# Patient Record
Sex: MALE | Race: WHITE | NOT HISPANIC OR LATINO | Employment: FULL TIME | URBAN - METROPOLITAN AREA
[De-identification: names, ages, dates, MRNs, and addresses within clinical notes are randomized per-mention and may not be internally consistent; named-entity substitution may affect disease eponyms.]

---

## 2018-07-24 ENCOUNTER — HOSPITAL ENCOUNTER (EMERGENCY)
Facility: HOSPITAL | Age: 38
Discharge: HOME | End: 2018-07-25
Attending: EMERGENCY MEDICINE

## 2018-07-24 VITALS
TEMPERATURE: 96.4 F | RESPIRATION RATE: 18 BRPM | OXYGEN SATURATION: 98 % | BODY MASS INDEX: 24.86 KG/M2 | WEIGHT: 164 LBS | SYSTOLIC BLOOD PRESSURE: 107 MMHG | HEART RATE: 114 BPM | HEIGHT: 68 IN | DIASTOLIC BLOOD PRESSURE: 60 MMHG

## 2018-07-24 DIAGNOSIS — S60.222A CONTUSION OF LEFT HAND, INITIAL ENCOUNTER: Primary | ICD-10-CM

## 2018-07-24 PROCEDURE — 90471 IMMUNIZATION ADMIN: CPT

## 2018-07-24 RX ORDER — ACETAMINOPHEN 325 MG/1
650 TABLET ORAL ONCE
Status: COMPLETED | OUTPATIENT
Start: 2018-07-25 | End: 2018-07-24

## 2018-07-24 RX ADMIN — TETANUS TOXOID, REDUCED DIPHTHERIA TOXOID AND ACELLULAR PERTUSSIS VACCINE, ADSORBED 0.5 ML: 5; 2.5; 8; 8; 2.5 SUSPENSION INTRAMUSCULAR at 23:54

## 2018-07-24 RX ADMIN — ACETAMINOPHEN 650 MG: 325 TABLET, FILM COATED ORAL at 23:53

## 2018-07-25 ENCOUNTER — APPOINTMENT (EMERGENCY)
Dept: RADIOLOGY | Facility: HOSPITAL | Age: 38
End: 2018-07-25

## 2018-07-25 PROCEDURE — 99283 EMERGENCY DEPT VISIT LOW MDM: CPT

## 2018-07-25 PROCEDURE — 90715 TDAP VACCINE 7 YRS/> IM: CPT | Performed by: EMERGENCY MEDICINE

## 2018-07-25 PROCEDURE — 73130 X-RAY EXAM OF HAND: CPT

## 2018-07-25 NOTE — DISCHARGE INSTRUCTIONS
Hand Sprain, Ambulatory Care   GENERAL INFORMATION:   A hand sprain  is when a ligament in your hand is stretched or torn  Ligaments are the strong tissues that connect bones  A hand sprain is usually caused by a fall onto your outstretched arm  You may have bruising, pain, and swelling of your injured hand  Seek immediate care for the following symptoms:   · Cold or numbness below the injury, such as in your fingers    · Increased pain, even after taking pain medicine    · New or increased trouble moving and using your hand, fingers, or wrist  Treatment for a hand sprain  may include a support device, such as a brace or splint  These devices limit movement and protect your joint  Treatment may also include pain medicine  Care for a hand sprain:   · Rest  your hand for 1 to 2 days after your injury  This will help decrease the risk of more damage to your hand  Avoid activities that cause pain  Return to normal activities as directed  · Apply ice  on your hand for 15 to 20 minutes every hour or as directed  Use an ice pack, or put crushed ice in a plastic bag  Cover it with a towel  Ice helps prevent tissue damage and decreases swelling and pain  · Use an elastic bandage as directed  An elastic bandage supports your hand and decreases swelling so it can heal  The elastic bandage should be snug but not tight  · Elevate  your hand above the level of your heart as often as you can  This will help decrease swelling and pain  Prop your hand on pillows or blankets to keep it elevated comfortably  · Exercise  your hand as directed to decrease stiffness and improve strength  You may be directed to exercise once you are able to move your hand without pain  Follow up with your healthcare provider as directed:  Write down your questions so you remember to ask them during your visits  CARE AGREEMENT:   You have the right to help plan your care  Learn about your health condition and how it may be treated  Discuss treatment options with your caregivers to decide what care you want to receive  You always have the right to refuse treatment  The above information is an  only  It is not intended as medical advice for individual conditions or treatments  Talk to your doctor, nurse or pharmacist before following any medical regimen to see if it is safe and effective for you  © 2014 [address] is for End User's use only and may not be sold, redistributed or otherwise used for commercial purposes  All illustrations and images included in CareNotes® are the copyrighted property of A D A M , Inc  or Juvenal Beckman

## 2018-07-25 NOTE — ED PROVIDER NOTES
History  Chief Complaint   Patient presents with    Hand Injury     Pt presents with left hand pain that started five hours ago when a chian snapped under tension and hit his hand  Pt states that it started to feel hot  This 41 y/o right-handed male c/o pain overlying the fourth and fifth metacarpals on his left hand  This began about 5 hours ago after injuring the hand  He was winching a car out of the mud when the chain broke  The end of the chain whipped around and struck him over the base of the 4th metacarpal   It hurts to fully flex and fully extend the fingers of the left hand  It was very swollen but this has improved with ace bandage  His left hand felt hot over the past hour  He is unsure if he had tetanus booster in the past ten years  None       History reviewed  No pertinent past medical history  Past Surgical History:   Procedure Laterality Date    BACK SURGERY         History reviewed  No pertinent family history  I have reviewed and agree with the history as documented  Social History   Substance Use Topics    Smoking status: Current Every Day Smoker    Smokeless tobacco: Never Used    Alcohol use Yes        Review of Systems   Constitutional: Negative  HENT: Negative  Eyes: Negative  Respiratory: Negative  Cardiovascular: Negative  Gastrointestinal: Negative  Endocrine: Negative  Genitourinary: Negative  Musculoskeletal: Negative  Skin: Negative  Allergic/Immunologic: Negative  Neurological: Negative  Hematological: Negative  Psychiatric/Behavioral: Negative  All other systems reviewed and are negative  Physical Exam  Physical Exam   Constitutional: He is oriented to person, place, and time  He appears well-developed and well-nourished  No distress  HENT:   Head: Normocephalic and atraumatic  Eyes: Conjunctivae and EOM are normal    Neck: Neck supple  No JVD present  Cardiovascular: Intact distal pulses  Pulmonary/Chest: Effort normal  No respiratory distress  Abdominal: Soft  There is no tenderness  Musculoskeletal: Normal range of motion  Dorsum of the left hand is tender, mostly at the base of the 4th and 5th metacarpals  Sensation is intact  Tendon function is intact  Capillary refill is brisk  There is a very tiny skin abrasion overlying the dorsum of the base of the 4th metacarpal   Bleeding is controlled  No foreign body noted  Neurological: He is alert and oriented to person, place, and time  He exhibits normal muscle tone  Coordination normal    Skin: Skin is warm and dry  Capillary refill takes less than 2 seconds  No rash noted  He is not diaphoretic  Psychiatric: He has a normal mood and affect  His behavior is normal    Nursing note and vitals reviewed        Vital Signs  ED Triage Vitals [07/24/18 2340]   Temperature Pulse Respirations Blood Pressure SpO2   (!) 96 4 °F (35 8 °C) (!) 119 18 109/64 98 %      Temp Source Heart Rate Source Patient Position - Orthostatic VS BP Location FiO2 (%)   Tympanic Monitor Sitting Right arm --      Pain Score       8           Vitals:    07/24/18 2340 07/24/18 2345   BP: 109/64 107/60   Pulse: (!) 119 (!) 114   Patient Position - Orthostatic VS: Sitting        Visual Acuity  Visual Acuity      Most Recent Value   L Pupil Size (mm)  3   R Pupil Size (mm)  3          ED Medications  Medications   tetanus-diphtheria-acellular pertussis (BOOSTRIX) IM injection 0 5 mL (0 5 mL Intramuscular Given 7/24/18 2354)   acetaminophen (TYLENOL) tablet 650 mg (650 mg Oral Given 7/24/18 2353)       Diagnostic Studies  Results Reviewed     None                 XR hand 3+ views LEFT   ED Interpretation by Carlos Duarte DO (07/25 0016)   No acute fracture, dislocation or foreign body                 Procedures  Static Splint Application  Date/Time: 7/25/2018 12:34 AM  Performed by: Nidia Garcia  Authorized by: Nidia Garcia     Patient location: ED  Procedure performed by emergency physician: Yes    Other Assisting Provider: No    Consent:     Consent obtained:  Verbal    Consent given by:  Patient    Risks discussed:  Numbness, pain and swelling  Universal protocol:     Procedure explained and questions answered to patient or proxy's satisfaction: yes      Radiology Images displayed and confirmed  If images not available, report reviewed: yes      Patient identity confirmed:  Verbally with patient  Indication:     Indications: sprain/strain    Pre-procedure details:     Pre-procedure CMS: Dysesthesia at the dorsum hand  Skin color:   normal  Procedure details:     Laterality:  Left    Location:  Hand    Hand:  L hand    Strapping: no      Splint type:  Volar short arm    Supplies:  Cotton padding, elastic bandage and Ortho-Glass  Post-procedure details:     Pain:  Improved    Sensation:  Normal    Neurovascular Exam: skin pink, capillary refill <2 sec, normal pulses and skin intact, warm, and dry      Patient tolerance of procedure: Tolerated well, no immediate complications           Phone Contacts  ED Phone Contact    ED Course                               MDM  Number of Diagnoses or Management Options  Contusion of left hand, initial encounter:      Amount and/or Complexity of Data Reviewed  Tests in the radiology section of CPT®: ordered and reviewed      CritCare Time    Disposition  Final diagnoses:   Contusion of left hand, initial encounter     Time reflects when diagnosis was documented in both MDM as applicable and the Disposition within this note     Time User Action Codes Description Comment    7/25/2018 12:30 AM Lisa Morocho Add [O19 147A] Contusion of left hand, initial encounter       ED Disposition     ED Disposition Condition Comment    Discharge  Juan Francisco Hekarine discharge to home/self care      Condition at discharge: Stable        Follow-up Information     Follow up With Specialties Details Why [address] [facility] Orthopedic Surgery In 1 week  if not much better [address] [address]  [phone]          Patient's Medications    No medications on file     No discharge procedures on file      ED Provider  Electronically Signed by           Dafne Cavanaugh DO  07/25/18 0262

## 2018-07-25 NOTE — ED NOTES
Pt given ice for hand  Cleaned wound on hand and it does not appear to be bleeding and is scabbed over        Sofia Mccann RN  07/25/18 0001

## 2020-12-05 ENCOUNTER — HOSPITAL ENCOUNTER (INPATIENT)
Facility: HOSPITAL | Age: 40
LOS: 1 days | Discharge: HOME | DRG: 043 | End: 2020-12-05
Attending: EMERGENCY MEDICINE | Admitting: INTERNAL MEDICINE
Payer: COMMERCIAL

## 2020-12-05 ENCOUNTER — APPOINTMENT (EMERGENCY)
Dept: CT IMAGING | Facility: HOSPITAL | Age: 40
DRG: 043 | End: 2020-12-05
Payer: COMMERCIAL

## 2020-12-05 VITALS
WEIGHT: 164.02 LBS | OXYGEN SATURATION: 97 % | HEIGHT: 68 IN | RESPIRATION RATE: 19 BRPM | TEMPERATURE: 97.6 F | SYSTOLIC BLOOD PRESSURE: 122 MMHG | DIASTOLIC BLOOD PRESSURE: 80 MMHG | BODY MASS INDEX: 24.86 KG/M2 | HEART RATE: 63 BPM

## 2020-12-05 DIAGNOSIS — G35 EXACERBATION OF MULTIPLE SCLEROSIS (HCC): Primary | ICD-10-CM

## 2020-12-05 DIAGNOSIS — G35 MULTIPLE SCLEROSIS EXACERBATION (HCC): ICD-10-CM

## 2020-12-05 DIAGNOSIS — R20.2 PARESTHESIAS IN RIGHT HAND: ICD-10-CM

## 2020-12-05 LAB
ALBUMIN SERPL BCP-MCNC: 3.9 G/DL (ref 3.5–5)
ALP SERPL-CCNC: 52 U/L (ref 46–116)
ALT SERPL W P-5'-P-CCNC: 34 U/L (ref 12–78)
ANION GAP SERPL CALCULATED.3IONS-SCNC: 5 MMOL/L (ref 4–13)
AST SERPL W P-5'-P-CCNC: 18 U/L (ref 5–45)
BASOPHILS # BLD AUTO: 0.05 THOUSANDS/ΜL (ref 0–0.1)
BASOPHILS NFR BLD AUTO: 1 % (ref 0–1)
BILIRUB DIRECT SERPL-MCNC: 0.16 MG/DL (ref 0–0.2)
BILIRUB SERPL-MCNC: 0.8 MG/DL (ref 0.2–1)
BILIRUB UR QL STRIP: NEGATIVE
BUN SERPL-MCNC: 12 MG/DL (ref 5–25)
CALCIUM SERPL-MCNC: 8.9 MG/DL (ref 8.3–10.1)
CHLORIDE SERPL-SCNC: 106 MMOL/L (ref 100–108)
CLARITY UR: NORMAL
CO2 SERPL-SCNC: 28 MMOL/L (ref 21–32)
COLOR UR: YELLOW
CREAT SERPL-MCNC: 0.7 MG/DL (ref 0.6–1.3)
EOSINOPHIL # BLD AUTO: 0.22 THOUSAND/ΜL (ref 0–0.61)
EOSINOPHIL NFR BLD AUTO: 3 % (ref 0–6)
ERYTHROCYTE [DISTWIDTH] IN BLOOD BY AUTOMATED COUNT: 12.7 % (ref 11.6–15.1)
GFR SERPL CREATININE-BSD FRML MDRD: 117 ML/MIN/1.73SQ M
GLUCOSE SERPL-MCNC: 92 MG/DL (ref 65–140)
GLUCOSE UR STRIP-MCNC: NEGATIVE MG/DL
HCT VFR BLD AUTO: 45.1 % (ref 36.5–49.3)
HGB BLD-MCNC: 15.1 G/DL (ref 12–17)
HGB UR QL STRIP.AUTO: NEGATIVE
IMM GRANULOCYTES # BLD AUTO: 0.03 THOUSAND/UL (ref 0–0.2)
IMM GRANULOCYTES NFR BLD AUTO: 0 % (ref 0–2)
KETONES UR STRIP-MCNC: NEGATIVE MG/DL
LEUKOCYTE ESTERASE UR QL STRIP: NEGATIVE
LYMPHOCYTES # BLD AUTO: 1.84 THOUSANDS/ΜL (ref 0.6–4.47)
LYMPHOCYTES NFR BLD AUTO: 21 % (ref 14–44)
MCH RBC QN AUTO: 31.4 PG (ref 26.8–34.3)
MCHC RBC AUTO-ENTMCNC: 33.5 G/DL (ref 31.4–37.4)
MCV RBC AUTO: 94 FL (ref 82–98)
MONOCYTES # BLD AUTO: 0.7 THOUSAND/ΜL (ref 0.17–1.22)
MONOCYTES NFR BLD AUTO: 8 % (ref 4–12)
NEUTROPHILS # BLD AUTO: 5.78 THOUSANDS/ΜL (ref 1.85–7.62)
NEUTS SEG NFR BLD AUTO: 67 % (ref 43–75)
NITRITE UR QL STRIP: NEGATIVE
NRBC BLD AUTO-RTO: 0 /100 WBCS
PH UR STRIP.AUTO: 7.5 [PH]
PLATELET # BLD AUTO: 326 THOUSANDS/UL (ref 149–390)
PMV BLD AUTO: 10.2 FL (ref 8.9–12.7)
POTASSIUM SERPL-SCNC: 4 MMOL/L (ref 3.5–5.3)
PROT SERPL-MCNC: 7.2 G/DL (ref 6.4–8.2)
PROT UR STRIP-MCNC: NEGATIVE MG/DL
RBC # BLD AUTO: 4.81 MILLION/UL (ref 3.88–5.62)
SODIUM SERPL-SCNC: 139 MMOL/L (ref 136–145)
SP GR UR STRIP.AUTO: 1.02 (ref 1–1.03)
UROBILINOGEN UR QL STRIP.AUTO: 0.2 E.U./DL
WBC # BLD AUTO: 8.62 THOUSAND/UL (ref 4.31–10.16)

## 2020-12-05 PROCEDURE — 81003 URINALYSIS AUTO W/O SCOPE: CPT | Performed by: INTERNAL MEDICINE

## 2020-12-05 PROCEDURE — 85025 COMPLETE CBC W/AUTO DIFF WBC: CPT | Performed by: PHYSICIAN ASSISTANT

## 2020-12-05 PROCEDURE — 36415 COLL VENOUS BLD VENIPUNCTURE: CPT | Performed by: PHYSICIAN ASSISTANT

## 2020-12-05 PROCEDURE — 99285 EMERGENCY DEPT VISIT HI MDM: CPT

## 2020-12-05 PROCEDURE — 99285 EMERGENCY DEPT VISIT HI MDM: CPT | Performed by: PHYSICIAN ASSISTANT

## 2020-12-05 PROCEDURE — 70450 CT HEAD/BRAIN W/O DYE: CPT

## 2020-12-05 PROCEDURE — 80076 HEPATIC FUNCTION PANEL: CPT | Performed by: PHYSICIAN ASSISTANT

## 2020-12-05 PROCEDURE — 99236 HOSP IP/OBS SAME DATE HI 85: CPT | Performed by: INTERNAL MEDICINE

## 2020-12-05 PROCEDURE — 99254 IP/OBS CNSLTJ NEW/EST MOD 60: CPT | Performed by: PSYCHIATRY & NEUROLOGY

## 2020-12-05 PROCEDURE — G1004 CDSM NDSC: HCPCS

## 2020-12-05 PROCEDURE — 80048 BASIC METABOLIC PNL TOTAL CA: CPT | Performed by: PHYSICIAN ASSISTANT

## 2020-12-05 RX ORDER — NICOTINE 21 MG/24HR
1 PATCH, TRANSDERMAL 24 HOURS TRANSDERMAL DAILY
Status: DISCONTINUED | OUTPATIENT
Start: 2020-12-05 | End: 2020-12-05 | Stop reason: HOSPADM

## 2020-12-05 RX ORDER — PANTOPRAZOLE SODIUM 40 MG/1
40 TABLET, DELAYED RELEASE ORAL
Status: DISCONTINUED | OUTPATIENT
Start: 2020-12-05 | End: 2020-12-05 | Stop reason: HOSPADM

## 2020-12-05 RX ORDER — ACETAMINOPHEN 325 MG/1
650 TABLET ORAL EVERY 6 HOURS PRN
Status: DISCONTINUED | OUTPATIENT
Start: 2020-12-05 | End: 2020-12-05 | Stop reason: HOSPADM

## 2020-12-05 RX ORDER — ONDANSETRON 2 MG/ML
4 INJECTION INTRAMUSCULAR; INTRAVENOUS EVERY 6 HOURS PRN
Status: DISCONTINUED | OUTPATIENT
Start: 2020-12-05 | End: 2020-12-05 | Stop reason: SDUPTHER

## 2020-12-05 RX ORDER — ONDANSETRON 2 MG/ML
4 INJECTION INTRAMUSCULAR; INTRAVENOUS EVERY 6 HOURS PRN
Status: DISCONTINUED | OUTPATIENT
Start: 2020-12-05 | End: 2020-12-05 | Stop reason: HOSPADM

## 2020-12-05 RX ADMIN — NICOTINE 1 PATCH: 21 PATCH, EXTENDED RELEASE TRANSDERMAL at 13:15

## 2020-12-05 RX ADMIN — SODIUM CHLORIDE 1000 MG: 0.9 INJECTION, SOLUTION INTRAVENOUS at 12:05

## 2020-12-05 RX ADMIN — PANTOPRAZOLE SODIUM 40 MG: 40 TABLET, DELAYED RELEASE ORAL at 13:14

## 2020-12-07 ENCOUNTER — TELEPHONE (OUTPATIENT)
Dept: NEUROLOGY | Facility: CLINIC | Age: 40
End: 2020-12-07

## 2020-12-11 ENCOUNTER — HOSPITAL ENCOUNTER (OUTPATIENT)
Dept: NEUROLOGY | Facility: CLINIC | Age: 40
Discharge: HOME | End: 2020-12-11
Payer: COMMERCIAL

## 2020-12-11 DIAGNOSIS — R20.2 PARESTHESIAS IN RIGHT HAND: ICD-10-CM

## 2020-12-11 PROCEDURE — 95886 MUSC TEST DONE W/N TEST COMP: CPT | Performed by: PSYCHIATRY & NEUROLOGY

## 2020-12-11 PROCEDURE — 95909 NRV CNDJ TST 5-6 STUDIES: CPT | Performed by: PSYCHIATRY & NEUROLOGY

## 2020-12-15 ENCOUNTER — HOSPITAL ENCOUNTER (OUTPATIENT)
Dept: RADIOLOGY | Facility: HOSPITAL | Age: 40
Discharge: HOME | End: 2020-12-15

## 2020-12-15 ENCOUNTER — HOSPITAL ENCOUNTER (OUTPATIENT)
Dept: MRI IMAGING | Facility: HOSPITAL | Age: 40
Discharge: HOME | End: 2020-12-15
Attending: PSYCHIATRY & NEUROLOGY
Payer: COMMERCIAL

## 2020-12-15 DIAGNOSIS — G35 MULTIPLE SCLEROSIS EXACERBATION (HCC): ICD-10-CM

## 2020-12-15 PROCEDURE — G1004 CDSM NDSC: HCPCS

## 2020-12-15 PROCEDURE — A9585 GADOBUTROL INJECTION: HCPCS | Performed by: PSYCHIATRY & NEUROLOGY

## 2020-12-15 PROCEDURE — 70553 MRI BRAIN STEM W/O & W/DYE: CPT

## 2020-12-15 RX ADMIN — GADOBUTROL 7 ML: 604.72 INJECTION INTRAVENOUS at 20:16

## 2021-01-20 ENCOUNTER — OFFICE VISIT (OUTPATIENT)
Dept: NEUROLOGY | Facility: CLINIC | Age: 41
End: 2021-01-20
Payer: COMMERCIAL

## 2021-01-20 VITALS
WEIGHT: 175.8 LBS | BODY MASS INDEX: 26.73 KG/M2 | DIASTOLIC BLOOD PRESSURE: 64 MMHG | HEART RATE: 92 BPM | SYSTOLIC BLOOD PRESSURE: 112 MMHG

## 2021-01-20 DIAGNOSIS — G35 MULTIPLE SCLEROSIS (HCC): Primary | ICD-10-CM

## 2021-01-20 DIAGNOSIS — G56.01 CARPAL TUNNEL SYNDROME OF RIGHT WRIST: ICD-10-CM

## 2021-01-20 PROCEDURE — 99214 OFFICE O/P EST MOD 30 MIN: CPT | Performed by: PHYSICIAN ASSISTANT

## 2021-01-20 RX ORDER — GABAPENTIN 300 MG/1
300 CAPSULE ORAL
Qty: 30 CAPSULE | Refills: 3 | Status: SHIPPED | OUTPATIENT
Start: 2021-01-20 | End: 2021-02-19 | Stop reason: SDUPTHER

## 2021-01-20 NOTE — PATIENT INSTRUCTIONS
Please review info on meds--Vumerity, Tecfidera, Aubagio  Please visit the [place] website for more info  Please have additional labs done    JCV needs to be done at [address] with special script   Will start gabapentin 300mg at bedtime  Follow up in 3 weeks

## 2021-01-20 NOTE — PROGRESS NOTES
Patient ID: Millie Whitman is a 41 y o  male  Assessment/Plan:    Multiple sclerosis [facility])  78-year-old male presents for a hospital follow-up  He was admitted to [facility] in August 2020 and seen by our Neurology group there  He presented with generalized weakness, left greater than right  He was found to have evidence of demyelinating disease in the brain and cervical spine without enhancement  LP was also completed and MS panel in the CSF was positive with greater than 5 oligoclonal bands and elevated IgG synthesis rate  He was given 5 days of IV steroids at that time  He was going to follow with [facility] Neurology closer to him, however there were insurance issues  He more recently presented to [place] [address] in December with right hand dysthesias, felt to be related to carpal tunnel  He had MRI brain following his discharge which showed a new, enhancing lesion in the left paramedian anterior frontal lobe  He can recall several years of neurologic symptoms including sensory changes, weakness and bladder changes, however was a heavy illicit drug user and said the symptoms were either ignored or not felt to the extent they would have been if he was not high  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  He has a family history of MS in his mother, who he does not have any contact with  We reviewed all of his studies from his hospitalization in August, as well as his more recent MRI brain in December 2020  He does meet the criteria for MS due to lesions  over time and space, as well as a positive lumbar puncture  We reviewed the pathophysiology of MS in detail today  We reviewed acute treatment with steroids and chronic treatment with disease modifying therapies  Patient is adamant about no injectable medications due to his prior IV drug use and does not want any type of needles in his home  Reviewed the oral medications in detail today    His fiancee is here and says they are unsure if they are planning any pregnancy  This would be concerning with the medication Aubagio  Review Tecfidera and Vumerity with him as well  I gave him some information to look over today  I would like to order some additional labs including JCV, NMO, QuantiFERON gold, B12, vitamin-D, Lyme, and see him back in 2-3 weeks to choose a DMT  Patient also has some dysthesias, especially when he is taking a hot shower  Discussed we can try gabapentin to see if that helps  Will start 300 mg HS and titrate as needed/tolerated  Side effects discussed  He was advised to call with any new or worsening symptoms  Carpal tunnel syndrome of right wrist  EMG of the right upper extremity completed 12/11/2020 demonstrated a mild carpal tunnel  Suggested OTC wrist splint  Diagnoses and all orders for this visit:    Multiple sclerosis ([facility] [address] )  -     CBC and differential; Future  -     Comprehensive metabolic panel; Future  -     Quantiferon TB Gold Plus; Future  -     Cancel: STRATIFY JCV(TM) AB W/RFX TO INHIBITION ASSAY; Future  -     NMO IgG Autoantibodies; Future  -     Lyme Total Antibody Profile with reflex to WB; Future  -     Vitamin B12; Future  -     Vitamin D 25 hydroxy; Future  -     gabapentin (NEURONTIN) 300 mg capsule; Take 1 capsule (300 mg total) by mouth daily at bedtime  -     CBC and differential  -     Comprehensive metabolic panel  -     NMO IgG Autoantibodies  -     Lyme Total Antibody Profile with reflex to WB  -     Vitamin B12  -     Vitamin D 25 hydroxy    Carpal tunnel syndrome of right wrist    Other orders  -     NON FORMULARY; Medical Marijuana - daily prn           Subjective:    HPI    Patient is a 18-year-old male with history of prior illicit drug use, who presents today for a hospital follow-up visit  Patient presented to [facility] on 8/23/20 with generalized weakness x 2 days   Prior to admission his left side appeared weaker than the right, he was falling to the left with ambulation, and had mild speech difficulties  CT head showed a white matter hypodensity in the left frontal region which could represent area of demyelination or prior infarct  No acute changes  CTA head and neck with no occlusive disease  Neurology was consulted  On exam he was found found to have left upper and lower extremity ataxia  MRI brain demonstrated multiple ovoid bilateral subcortical and periventricular T2 signal lesions oriented perpendicular to the ventricular system  Single similar T2 lesion oriented in the right anterior inferior lateral thalamus  Repeat study with contrast did not demonstrate any enhancement  MRI C-spine demonstrated T2 lesions compatible with a history of MS in the left lateral cord at C2-C3, mid cord at C3-C4 small right anterior lesion at C4  There is no abnormal enhancement  MRI T-spine demonstrated a healed T7 vertebral body fracture with hardware  There is no clear T2 signal abnormalities noted within the thoracic cord  There is no abnormal enhancement  LP was performed  Basic CSF studies were unremarkable including glucose, protein, rbc's, wbc's  MS panel was positive with >5 oligoclonal bands not present in the serum, elevated IgG synthesis rate, normal MBP  He completed 5 days of high-dose Solu-Medrol 1 g daily  He was told he met the criteria for MS diagnosis  At time of discharge, the plan was for him to follow with [facility] Neurology  Patient then presented to [facility] on 12/05/2020 complaining of painful pressure and dysthesias in the right distal upper extremity exacerbated with use  Neurology was consulted  It was suspected that he had a peripheral nerve issue such as carpal tunnel syndrome  It was recommended that he obtain MRI brain outpatient (it was not available at the time he was admitted), and EMG of the right upper extremity      MRI brain with and without contrast was completed on 12/15/2020 and demonstrated a moderate degree of demyelinating disease with chronic appearing lesions  There was 1 enhancing subcentimeter lesion in the paramedian anterior left frontal lobe  EMG of the right upper extremity was completed on 12/11/2020 and demonstrated a mild carpal tunnel syndrome  Today, patient presents with his fiabele  Patient reports that he does understand that he has MS based on the workup completed during his August 2020 hospitalization  He did not follow-up with [facility] Neurology due to insurance reasons  He has not seen a neurologist in the outpatient setting at  Patient tells me that he has a family history of MS in his mother, who he has no contact with  He is not sure what her course has been like  Patient reports that over the years he has had multiple symptoms that could have been concerning for a neurologic disorder including sensory and motor dysfunction of different limbs at different times, some increased frequency and urgency of urination, some falls  He reports that he had heavy drug use and probably ignored most of it or did not feel most of it to the extent he would have if he was not high  He has been clean and sober x 22 months  He currently reports some weakness in the extremities at times, as well as sensory changes  He says that when he is in a hot shower he has dysesthesias in the upper extremities particularly  He denies any vision changes  He denies any swallowing difficulty  He is a   The following portions of the patient's history were reviewed and updated as appropriate: current medications, past family history, past medical history, past social history, past surgical history and problem list          Objective:    Blood pressure 112/64, pulse 92, weight 79 7 kg (175 lb 12 8 oz)  Physical Exam  Constitutional:       Appearance: Normal appearance  He is well-developed  HENT:      Head: Normocephalic and atraumatic     Eyes:      Extraocular Movements: EOM normal       Pupils: Pupils are equal, round, and reactive to light  Pulmonary:      Effort: Pulmonary effort is normal    Skin:     General: Skin is warm and dry  Neurological:      Mental Status: He is alert  Coordination: Coordination is intact  Deep Tendon Reflexes: Reflexes are normal and symmetric  Psychiatric:         Mood and Affect: Mood normal          Speech: Speech normal          Behavior: Behavior normal          Neurological Exam  Mental Status  Alert  Oriented to person, place, time and situation  Speech is normal  Language is fluent with no aphasia  Attention and concentration are normal     Cranial Nerves  CN II: Visual fields full to confrontation  CN III, IV, VI: Extraocular movements intact bilaterally  Pupils equal round and reactive to light bilaterally  CN V: Facial sensation is normal   CN VII: Full and symmetric facial movement  CN VIII: Hearing is normal   CN IX, X: Palate elevates symmetrically  CN XI: Shoulder shrug strength is normal   CN XII: Tongue midline without atrophy or fasciculations  Motor   Normal muscle tone  Strength is 5/5 in all four extremities except as noted  Left HF 5-/5  Sensory  Light touch is normal in upper and lower extremities  Reflexes  Deep tendon reflexes are 2+ and symmetric in all four extremities with downgoing toes bilaterally  Coordination  Finger-to-nose, rapid alternating movements and heel-to-shin normal bilaterally without dysmetria  Gait  Casual gait is normal including stance, stride, and arm swing  ROS:    Review of Systems   Constitutional: Positive for fatigue  Negative for appetite change and fever  HENT: Negative  Negative for hearing loss, tinnitus, trouble swallowing and voice change  Eyes: Negative  Negative for photophobia and pain  Respiratory: Negative  Negative for shortness of breath  Cardiovascular: Negative  Negative for palpitations  Gastrointestinal: Negative  Negative for nausea and vomiting  Endocrine: Negative  Negative for cold intolerance  Genitourinary: Positive for frequency and urgency  Negative for dysuria  Musculoskeletal: Positive for back pain and gait problem  Negative for myalgias and neck pain  Skin: Negative  Negative for rash  Neurological: Positive for weakness (R hand)  Negative for dizziness, tremors, seizures, syncope, facial asymmetry, speech difficulty, light-headedness, numbness and headaches  Hematological: Negative  Does not bruise/bleed easily  Psychiatric/Behavioral: Negative  Negative for confusion, hallucinations and sleep disturbance       I personally reviewed and updated the ROS as appropriate

## 2021-01-24 NOTE — ASSESSMENT & PLAN NOTE
EMG of the right upper extremity completed 12/11/2020 demonstrated a mild carpal tunnel  Suggested OTC wrist splint

## 2021-02-05 LAB
25(OH)D3+25(OH)D2 SERPL-MCNC: 28.4 NG/ML (ref 30–100)
ALBUMIN SERPL-MCNC: 4.8 G/DL (ref 4–5)
ALBUMIN/GLOB SERPL: 1.8 {RATIO} (ref 1.2–2.2)
ALP SERPL-CCNC: 59 IU/L (ref 39–117)
ALT SERPL-CCNC: 40 IU/L (ref 0–44)
AQP4 H2O CHANNEL AB SERPL IA-ACNC: <1.5 U/ML (ref 0–3)
AST SERPL-CCNC: 30 IU/L (ref 0–40)
B BURGDOR IGG+IGM SER-ACNC: <0.91 ISR (ref 0–0.9)
BASOPHILS # BLD AUTO: 0.1 X10E3/UL (ref 0–0.2)
BASOPHILS NFR BLD AUTO: 1 %
BILIRUB SERPL-MCNC: 0.5 MG/DL (ref 0–1.2)
BUN SERPL-MCNC: 16 MG/DL (ref 6–24)
BUN/CREAT SERPL: 17 (ref 9–20)
CALCIUM SERPL-MCNC: 10.1 MG/DL (ref 8.7–10.2)
CHLORIDE SERPL-SCNC: 101 MMOL/L (ref 96–106)
CO2 SERPL-SCNC: 26 MMOL/L (ref 20–29)
CREAT SERPL-MCNC: 0.92 MG/DL (ref 0.76–1.27)
EOSINOPHIL # BLD AUTO: 0.2 X10E3/UL (ref 0–0.4)
EOSINOPHIL NFR BLD AUTO: 2 %
ERYTHROCYTE [DISTWIDTH] IN BLOOD BY AUTOMATED COUNT: 12.8 % (ref 11.6–15.4)
GLOBULIN SER-MCNC: 2.6 G/DL (ref 1.5–4.5)
GLUCOSE SERPL-MCNC: 100 MG/DL (ref 65–99)
HCT VFR BLD AUTO: 45.6 % (ref 37.5–51)
HGB BLD-MCNC: 15.5 G/DL (ref 13–17.7)
IMM GRANULOCYTES # BLD: 0.1 X10E3/UL (ref 0–0.1)
IMM GRANULOCYTES NFR BLD: 1 %
JCPYV AB SERPL QL IA: POSITIVE
LYMPHOCYTES # BLD AUTO: 1.7 X10E3/UL (ref 0.7–3.1)
LYMPHOCYTES NFR BLD AUTO: 18 %
MCH RBC QN AUTO: 31.4 PG (ref 26.6–33)
MCHC RBC AUTO-ENTMCNC: 34 G/DL (ref 31.5–35.7)
MCV RBC AUTO: 92 FL (ref 79–97)
MONOCYTES # BLD AUTO: 0.8 X10E3/UL (ref 0.1–0.9)
MONOCYTES NFR BLD AUTO: 8 %
NEUTROPHILS # BLD AUTO: 6.9 X10E3/UL (ref 1.4–7)
NEUTROPHILS NFR BLD AUTO: 70 %
PLATELET # BLD AUTO: 335 X10E3/UL (ref 150–450)
POTASSIUM SERPL-SCNC: 4.8 MMOL/L (ref 3.5–5.2)
PROT SERPL-MCNC: 7.4 G/DL (ref 6–8.5)
RBC # BLD AUTO: 4.94 X10E6/UL (ref 4.14–5.8)
SL AMB EGFR AFRICAN AMERICAN: 118 ML/MIN/1.73
SL AMB EGFR NON AFRICAN AMERICAN: 102 ML/MIN/1.73
SL AMB INDEX VALUE: 1.48
SODIUM SERPL-SCNC: 139 MMOL/L (ref 134–144)
VIT B12 SERPL-MCNC: 800 PG/ML (ref 232–1245)
WBC # BLD AUTO: 9.6 X10E3/UL (ref 3.4–10.8)

## 2021-02-08 ENCOUNTER — TELEPHONE (OUTPATIENT)
Dept: NEUROLOGY | Facility: CLINIC | Age: 41
End: 2021-02-08

## 2021-02-08 DIAGNOSIS — E55.9 VITAMIN D DEFICIENCY: Primary | ICD-10-CM

## 2021-02-08 RX ORDER — ERGOCALCIFEROL 1.25 MG/1
50000 CAPSULE ORAL WEEKLY
Qty: 8 CAPSULE | Refills: 0 | Status: SHIPPED | OUTPATIENT
Start: 2021-02-08

## 2021-02-08 NOTE — TELEPHONE ENCOUNTER
----- Message from Osmel Hood PA-C sent at 2/8/2021 12:04 PM EST -----  Labs look good except for low vit D  I am sending in a Rx for vit D once a week x 8 weeks

## 2021-02-19 ENCOUNTER — TELEMEDICINE (OUTPATIENT)
Dept: NEUROLOGY | Facility: CLINIC | Age: 41
End: 2021-02-19
Payer: COMMERCIAL

## 2021-02-19 DIAGNOSIS — G35 MULTIPLE SCLEROSIS (HCC): Primary | ICD-10-CM

## 2021-02-19 DIAGNOSIS — E55.9 VITAMIN D DEFICIENCY: ICD-10-CM

## 2021-02-19 PROCEDURE — 99214 OFFICE O/P EST MOD 30 MIN: CPT | Performed by: PHYSICIAN ASSISTANT

## 2021-02-19 RX ORDER — GABAPENTIN 300 MG/1
300 CAPSULE ORAL 2 TIMES DAILY
Qty: 60 CAPSULE | Refills: 3 | Status: SHIPPED | OUTPATIENT
Start: 2021-02-19 | End: 2021-04-02 | Stop reason: SDUPTHER

## 2021-02-19 NOTE — ASSESSMENT & PLAN NOTE
Currently on weekly ergocalciferol  Once finished, would recommend OTC vit D3 4,000IU daily  Will recheck a level in several months

## 2021-02-19 NOTE — PROGRESS NOTES
Virtual Regular Visit      Assessment/Plan:    Problem List Items Addressed This Visit        Nervous and Auditory    Multiple sclerosis ([facility] [address] ) - Primary     Patient with newly diagnosed MS after hospitalization in August 2020  He was noted to have evidence of demyelinating disease in the brain and c-spine  CSF positive with >5 OBCs and elevated IgG synthesis rate  He received 5 days of IV steroids  He had another MRI brain in Dec 2020 which showed a single new enhancing lesion in the brain  NMO negative  He did not follow up in the outpatient setting until January 2021  We discussed DMT options again today  He is only interested in oral meds  He is JCV positive  After discussing Tecfidera, Vumerity, Gilenya, Aubagio, he is most interested in [place]  His fiance plans on re-starting birth control  Will arrange for him to stop by the office and sign start form for Aubagio  He understands he will need monthly labs for the first 6 months of Aubagio therapy  Ordered today  For some reason, the lab did not draw his Quantiferon Gold that I ordered at last visit, so will have him get that done prior to Aubagio start  I started him on gabapentin 300mg HS for dysthesias  This has helped a bit, but will try increasing to see if he can attain better symptom control  -increase to 300mg BID, or can take 600mg HS if the AM dose makes him tired  Relevant Medications    gabapentin (NEURONTIN) 300 mg capsule    Other Relevant Orders    CBC and differential    Hepatic function panel       Other    Vitamin D deficiency     Currently on weekly ergocalciferol  Once finished, would recommend OTC vit D3 4,000IU daily  Will recheck a level in several months  Patient to follow up in 2-3 months or sooner if needed  He was advised to call for any new or worsening symptoms           Reason for visit is   Chief Complaint   Patient presents with    Virtual Regular Visit        Encounter provider Robin Garcia PA-C    Provider located at [address] [place] [address] Drive  [address]  [facility] [address]  [phone]      Recent Visits  No visits were found meeting these conditions  Showing recent visits within past 7 days and meeting all other requirements     Today's Visits  Date Type Provider Dept   02/19/21 Telemedicine Robin Garcia PA-C [facility] today's visits and meeting all other requirements     Future Appointments  No visits were found meeting these conditions  Showing future appointments within next 150 days and meeting all other requirements        The patient was identified by name and date of birth  Lou Wade was informed that this is a telemedicine visit and that the visit is being conducted through [facility] and patient was informed that this is a secure, HIPAA-compliant platform  He agrees to proceed     My office door was closed  No one else was in the room  He acknowledged consent and understanding of privacy and security of the video platform  The patient has agreed to participate and understands they can discontinue the visit at any time  Patient is aware this is a billable service  Subjective    HPI:  Patient is a 39-year-old male who presents today for follow up of newly diagnosed MS  He was last seen on 1/20/2021  Patient had initially presented to [facility] on 8/23/20 with generalized weakness x 2 days  Prior to admission his left side appeared weaker than the right, he was falling to the left with ambulation, and had mild speech difficulties  CT head showed a white matter hypodensity in the left frontal region which could represent area of demyelination or prior infarct  No acute changes  CTA head and neck with no occlusive disease  Neurology was consulted  On exam he was found found to have left upper and lower extremity ataxia    MRI brain demonstrated multiple ovoid bilateral subcortical and periventricular T2 signal lesions oriented perpendicular to the ventricular system  Single similar T2 lesion oriented in the right anterior inferior lateral thalamus  Repeat study with contrast did not demonstrate any enhancement  MRI C-spine demonstrated T2 lesions compatible with a history of MS in the left lateral cord at C2-C3, mid cord at C3-C4 small right anterior lesion at C4  There is no abnormal enhancement  MRI T-spine demonstrated a healed T7 vertebral body fracture with hardware  There is no clear T2 signal abnormalities noted within the thoracic cord  There is no abnormal enhancement  LP was performed  Basic CSF studies were unremarkable including glucose, protein, rbc's, wbc's  MS panel was positive with >5 oligoclonal bands not present in the serum, elevated IgG synthesis rate, normal MBP  He completed 5 days of high-dose Solu-Medrol 1 g daily  He was told he met the criteria for MS diagnosis  At time of discharge, the plan was for him to follow with [facility]  He did not follow-up with [facility] due to insurance reasons  Patient then presented to [facility] on 12/05/2020 complaining of painful pressure and dysthesias in the right distal upper extremity exacerbated with use  Neurology was consulted  It was suspected that he had a peripheral nerve issue such as carpal tunnel syndrome  It was recommended that he obtain MRI brain outpatient (it was not available at the time he was admitted), and EMG of the right upper extremity  MRI brain with and without contrast was completed on 12/15/2020 and demonstrated a moderate degree of demyelinating disease with chronic appearing lesions  There was 1 enhancing subcentimeter lesion in the paramedian anterior left frontal lobe  EMG of the right upper extremity was completed on 12/11/2020 and demonstrated a mild carpal tunnel syndrome       Patient reported a family history of MS in his mother, who he has no contact with  He is not sure what her course has been like  Patient reported that over the years he has had multiple symptoms that could have been concerning for a neurologic disorder including sensory and motor dysfunction of different limbs at different times, some increased frequency and urgency of urination, some falls  He reported he had heavy drug use and probably ignored most of it or did not feel most of it to the extent he would have if he was not high  He has been clean and sober x nearly 2 years  He currently reports some weakness in the extremities at times, as well as sensory changes  He says that when he is in a hot shower he has dysesthesias in the upper extremities particularly  He denies any vision changes  He denies any swallowing difficulty  He is a   At timing of last visit we discussed disease modifying therapy  He was adamant about no injectable meds given prior drug use and having needles in the house would be a trigger for him  We discussed oral meds  He has had labs done since last visit  Vit D low at 28--now on weekly ergocalciferol x 8 weeks  Normal B12, neg Lyme, neg NMO  He is JCV positive with index 1 48  He is most interested in [place]  His fiancé says she is planning on going back on birth control  The gabapentin has helped with the dysthesias in the shower, but still present        Past Medical History:   Diagnosis Date    [facility]        Past Surgical History:   Procedure Laterality Date    BACK SURGERY      HAND SURGERY Right 2011       Current Outpatient Medications   Medication Sig Dispense Refill    ergocalciferol (VITAMIN D2) 50,000 units Take 1 capsule (50,000 Units total) by mouth once a week 8 capsule 0    gabapentin (NEURONTIN) 300 mg capsule Take 1 capsule (300 mg total) by mouth 2 (two) times a day 60 capsule 3    NON FORMULARY Medical Marijuana - daily prn       No current facility-administered medications for this visit  No Known Allergies    Review of Systems   Constitutional: Negative  Negative for fatigue and fever  HENT: Negative  Negative for hearing loss, tinnitus and trouble swallowing  Eyes: Negative for photophobia, pain and visual disturbance  Respiratory: Negative  Negative for cough and shortness of breath  Cardiovascular: Negative  Negative for chest pain, palpitations and leg swelling  Gastrointestinal: Negative for constipation, diarrhea, nausea and vomiting  Endocrine: Negative  Genitourinary: Negative  Musculoskeletal: Negative  Skin: Negative  Neurological: Negative  Negative for dizziness, tremors, seizures, syncope, speech difficulty, weakness and headaches  Hematological: Negative  Psychiatric/Behavioral: Negative for confusion, decreased concentration, dysphoric mood, hallucinations, sleep disturbance and suicidal ideas  The patient is not nervous/anxious  Video Exam    There were no vitals filed for this visit  Physical Exam  Constitutional:       Appearance: Normal appearance  HENT:      Head: Normocephalic and atraumatic  Neurological:      Mental Status: He is alert  Comments: Exam limited due to patient was sitting in his work truck--limited movement      Mental status: AO x 3, able to follow commands, no dysarthria or aphasia    CN 1: not tested  CN 2: not tested  CN 3, 4, 6: no noticeable palsy, EOMs intact  CN 5: not tested  CN 7: face symmetrical  CN 8: hearing intact to voice  CN 9: not tested  CN 10: not tested  CN 11: shoulder shrug symmetric   CN 12: tongue midline     Motor:  Able to move arm symmetrically, no pronator drift  No abnormal movements appreciated      Psychiatric:         Mood and Affect: Mood normal          Behavior: Behavior normal           I spent 15 minutes directly with the patient during this visit      VIRTUAL VISIT DISCLAIMER    Germain Marlys acknowledges that he has consented to an online visit or consultation  He understands that the online visit is based solely on information provided by him, and that, in the absence of a face-to-face physical evaluation by the physician, the diagnosis he receives is both limited and provisional in terms of accuracy and completeness  This is not intended to replace a full medical face-to-face evaluation by the physician  Tori Ribera understands and accepts these terms

## 2021-02-19 NOTE — ASSESSMENT & PLAN NOTE
Patient with newly diagnosed MS after hospitalization in August 2020  He was noted to have evidence of demyelinating disease in the brain and c-spine  CSF positive with >5 OBCs and elevated IgG synthesis rate  He received 5 days of IV steroids  He had another MRI brain in Dec 2020 which showed a single new enhancing lesion in the brain  NMO negative  He did not follow up in the outpatient setting until January 2021  We discussed DMT options again today  He is only interested in oral meds  He is JCV positive  After discussing Tecfidera, Vumerity, Gilenya, Aubagio, he is most interested in [place]  His fiance plans on re-starting birth control  Will arrange for him to stop by the office and sign start form for Aubagio  He understands he will need monthly labs for the first 6 months of Aubagio therapy  Ordered today  For some reason, the lab did not draw his Quantiferon Gold that I ordered at last visit, so will have him get that done prior to Aubagio start  I started him on gabapentin 300mg HS for dysthesias  This has helped a bit, but will try increasing to see if he can attain better symptom control  -increase to 300mg BID, or can take 600mg HS if the AM dose makes him tired

## 2021-02-19 NOTE — PATIENT INSTRUCTIONS
-increase gabapentin  Can take 300mg twice a day, or can take both 300mg tablets (600mg total) at night  -continue weekly vitamin D prescription  Once you are done with that, can start taking over the counter vitamin D3 4,000IU daily  -will arrange for you to stop by the [place] office and sign a start form for the Aubagio  My medical assistant will call you to arrange     -a month after starting Aubagio, you will start going for monthly labs    This can be done anywhere you normally go for labs  -return to the office in 2-3 months    Call for any new or worsening symptoms

## 2021-02-22 ENCOUNTER — TELEPHONE (OUTPATIENT)
Dept: NEUROLOGY | Facility: CLINIC | Age: 41
End: 2021-02-22

## 2021-02-22 NOTE — TELEPHONE ENCOUNTER
Patient will be starting Aubagio  I am not sure if he needs to sign a start form, or if we can send without signature (he was seen virtually, so obviously did not sign the form in-office)  I told him he could stop by [facility] office to sign, or we could try sending without signature first       As discussed, please let him know we will try sending without signature and see if company can contact him  If not, he can stop into [facility] and sign when someone from our office is there

## 2021-03-03 NOTE — TELEPHONE ENCOUNTER
Called [facility] at [phone] and spoke with Praneeth Alford  She reports start form was missing prescriber's NPI and tax ID  Provided this information verbally  Pt was also advised that consent is needed  This can be completed on [email or web address]  Pt has yet to complete this  Discussed with pt  He would like to go to the [facility] neuro office tomorrow morning to sign the aubagio form as he cannot submit online  Did explain that he will need to sign in both sections 1 and 2  Pt agreeable  Start form is available in chart under media dated 2/23/21

## 2021-03-09 NOTE — TELEPHONE ENCOUNTER
Pt reports he spoke with [facility] yesterday and was told his Aubagio needs a PA  Mich Mccoy, to confirm- is pt taking 7mg daily for 30 days, then 14mg daily?

## 2021-03-10 NOTE — TELEPHONE ENCOUNTER
Per chart review, pt still needs TB testing completed  This will be needed for PA  Pt made aware  He will go to a [facility] lab  Order emailed to email on file via fax machine per pt request to ensure this is not missed  Asked that he have this drawn asap  Pt agreeable  Request started and saved on CMM

## 2021-03-11 DIAGNOSIS — G35 MULTIPLE SCLEROSIS (HCC): Primary | ICD-10-CM

## 2021-03-11 RX ORDER — TERIFLUNOMIDE 7 MG/1
7 TABLET, FILM COATED ORAL DAILY
Qty: 30 TABLET | Refills: 0 | Status: SHIPPED | OUTPATIENT
Start: 2021-03-11 | End: 2021-05-16

## 2021-03-11 RX ORDER — RENAGEL 800 MG/1
14 TABLET ORAL DAILY
Qty: 30 TABLET | Refills: 5 | Status: SHIPPED | OUTPATIENT
Start: 2021-03-11 | End: 2021-05-16

## 2021-03-11 NOTE — TELEPHONE ENCOUNTER
Received fax from [facility] stating they require new Aubagio prescriptions as the scripts they received are not legible  Addressing Aubagio start in previous encounter  Please review and sign if agreeable, thanks!

## 2021-03-16 NOTE — TELEPHONE ENCOUNTER
Reminded pt of needed lab  He reports he will try to have this done today or tomorrow  Awaiting results

## 2021-03-17 ENCOUNTER — TELEPHONE (OUTPATIENT)
Dept: NEUROLOGY | Facility: CLINIC | Age: 41
End: 2021-03-17

## 2021-03-17 NOTE — TELEPHONE ENCOUNTER
Patient called regarding labwork  He is going to [facility] today to have TB testing done  He needs lab order faxed to [facility]  [phone]  Order faxed

## 2021-03-19 ENCOUNTER — TELEPHONE (OUTPATIENT)
Dept: NEUROLOGY | Facility: CLINIC | Age: 41
End: 2021-03-19

## 2021-03-19 NOTE — TELEPHONE ENCOUNTER
Received call from Sarah Bautista6  He states a PA form for Aubagio was faxed to us on 3/12  No fax received  Per 2/22 encounter, PA form cannot be submitted until patient has TB testing performed  We are still waiting for patient to get labwork completed

## 2021-03-20 LAB
GAMMA INTERFERON BACKGROUND BLD IA-ACNC: 0.01 IU/ML
M TB IFN-G CD4+ T-CELLS BLD-ACNC: 0.08 IU/ML
M TB IFN-G CD4+ T-CELLS BLD-ACNC: 0.14 IU/ML
MITOGEN IGNF BLD-ACNC: >10 IU/ML
QUANTIFERON INCUBATION COMMENT: NORMAL
QUANTIFERON-TB GOLD PLUS: NEGATIVE
SERVICE CMNT-IMP: NORMAL

## 2021-03-22 NOTE — TELEPHONE ENCOUNTER
TB test results received and negative  PA requests submitted on CMM:    7mg Key: [id number]  14mg Key: [id number]     Awaiting determination

## 2021-03-24 NOTE — TELEPHONE ENCOUNTER
Aubagio 7mg approved from 3/22/21 to 4/22/21  Aubagio 14mg approved from 3/22/21 to 3/22/22  Pt made aware  Advised that he call [facility] pharmacy to schedule delivery  Provided phone # for the pharmacy  Pt reports his wife saw that Aubagio can cause tingling in arms and legs  He is concerned about this  I reviewed that per [employer], peripheral neuropathy can occur (1 4%-1 9%)  Advised that if he would experience any new symptoms after starting the medication he should contact our office  Pt is agreeable  Reminded pt of needed monthly labs beginning 30 days after starting Aubagio  Pt verbalizes understanding  Will f/u with pt next week to confirm if medication was received

## 2021-03-31 ENCOUNTER — TELEPHONE (OUTPATIENT)
Dept: NEUROLOGY | Facility: CLINIC | Age: 41
End: 2021-03-31

## 2021-03-31 DIAGNOSIS — G35 MULTIPLE SCLEROSIS (HCC): ICD-10-CM

## 2021-03-31 NOTE — TELEPHONE ENCOUNTER
Patient called to make us aware that he was taking his Gabapentin 300mg incorrectly  Patient states he was taking 1 capsule in AM and 2 capsules in PM  Per office visit note 2/19, patient is to take Gabapentin 300-1 capsule by mouth BID  mg @ HS if AM dose makes him tired  Patient also requesting refills  I advised patient there are 3 refills on script  Patient verbalized understanding      Jeyson boyd

## 2021-04-01 NOTE — TELEPHONE ENCOUNTER
Left detailed message for the patient making him aware  Did repeat statistics in the voicemail  Pt is to call back to confirm receipt of voicemail and advise if he will be starting Aubagio

## 2021-04-01 NOTE — TELEPHONE ENCOUNTER
Pt confirms he received his Aubagio today  States he is nervous to start the medication  He is specifically concerned about the potential side effect of neuropathy in his upper extremities  He is very concerned about this occurring as he is a  and this would affect his work  Pt is asking how likely this side effect would be to occur and what would be done if he would experience this  Notes he would prefer to discontinue the medication right away if he would experience any neuropathy  Please advise

## 2021-04-01 NOTE — TELEPHONE ENCOUNTER
Peripheral neuropathy is not a "common" side effect of Aubagio  As you already let him know, it occurs very rarely and you can repeat those statistics to him  He already has paresthesias/neuropathy from his MS (on gabapentin)  Would not expect any change in this

## 2021-04-02 RX ORDER — GABAPENTIN 300 MG/1
CAPSULE ORAL
Qty: 90 CAPSULE | Refills: 3 | Status: SHIPPED | OUTPATIENT
Start: 2021-04-02 | End: 2021-08-16

## 2021-04-02 NOTE — TELEPHONE ENCOUNTER
Noted, agree with your explanation to him about the importance of DMTs for MS treatment, thank you for that  I hope he changes his mind and decides to take a medication

## 2021-04-02 NOTE — TELEPHONE ENCOUNTER
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 and does not like the idea of taking pills every day  Also still concerned about neuropathy in his hands  Per 2/19/21 note, "He was adamant about no injectable meds given prior drug use and having needles in the house would be a trigger for him "    I explained to the patient that if he would like to avoid all needles, PO medications are his only option  Discussed the importance of being on a DMT to prevent further relapses and permanent disability  Also discussed that untreated MS can also cause relapses leading to numbness and tingling in his arms and hands  Pt also reported concern of feeling withdrawal effects if he misses any doses of Aubagio  I explained this would not occur with this medication  Pt also noted concern of GI side effects with this medication  I explained that there are other PO DMTs that are more known for their GI side effects  Explained that if he has any side effects with Aubagio he can call our office and we will assist      Pt states that he needs to give this more thought and will call back with his decision  Roberto Noble

## 2021-04-02 NOTE — TELEPHONE ENCOUNTER
Pt states he decreased gabapentin 300mg caps to only taking 2 caps at night  States that he now "feels horrible with body aches " States this had improved when taking gabapentin 300mg in the AM and 600mg at night  Asking if he can return to this dose as it was more effective for him

## 2021-04-03 DIAGNOSIS — E55.9 VITAMIN D DEFICIENCY: ICD-10-CM

## 2021-04-06 RX ORDER — ERGOCALCIFEROL 1.25 MG/1
CAPSULE ORAL
Qty: 4 CAPSULE | Refills: 1 | OUTPATIENT
Start: 2021-04-06

## 2021-04-15 ENCOUNTER — TELEPHONE (OUTPATIENT)
Dept: NEUROLOGY | Facility: CLINIC | Age: 41
End: 2021-04-15

## 2021-04-15 NOTE — TELEPHONE ENCOUNTER
I would suggest Copaxone, which is SQ 3 times a week, or he could consider Interferons such as Rebif (SQ 3 times a week) or Avonex (once a week IM)  We can send him info home on these    We can also do a virtual visit to discuss them further if he wishes

## 2021-04-15 NOTE — TELEPHONE ENCOUNTER
Patient states he would like to receive information on injectable medications  He has concerns with possible side effect of numbness in hands with regard to his Aubagio 14mg medication  This was discussed with patient previously (see 2/22 encounter)  Patient states he feels he would do better if only taking medication once a week or several times per week vs  every day due to his past addiction  Reports his wife's friend takes a 1x week injectable and is doing well with that  He is not sure of the name (Avonex?)    Patient previously stated he was only interested in oral medications  Per 2/19/21 note, "He was adamant about no injectable meds given prior drug use and having needles in the house would be a trigger for him "    Patient confirms he takes Gabapentin every day but feels uneasy "stacking medications "    Luis A Laughter - please advise

## 2021-04-20 NOTE — TELEPHONE ENCOUNTER
Patient thinks he would be most interested in Avonex but he is not sure  He would like information on all three medications  Patient interested in virtual appt to discuss options  Scheduled virtual appt on 5/13  Rowan Harris - could you please mail information on Copaxone, Rebif and Avonex to patient?  Thanks!!

## 2021-05-13 ENCOUNTER — TELEMEDICINE (OUTPATIENT)
Dept: NEUROLOGY | Facility: CLINIC | Age: 41
End: 2021-05-13
Payer: COMMERCIAL

## 2021-05-13 VITALS — HEIGHT: 68 IN | WEIGHT: 170 LBS | BODY MASS INDEX: 25.76 KG/M2

## 2021-05-13 DIAGNOSIS — G35 MULTIPLE SCLEROSIS (HCC): Primary | ICD-10-CM

## 2021-05-13 PROCEDURE — 99214 OFFICE O/P EST MOD 30 MIN: CPT | Performed by: PHYSICIAN ASSISTANT

## 2021-05-13 RX ORDER — NICOTINE 21 MG/24HR
PATCH, TRANSDERMAL 24 HOURS TRANSDERMAL
COMMUNITY
Start: 2021-05-11

## 2021-05-13 NOTE — PATIENT INSTRUCTIONS
Will update MRI brain  Try magnesium oxide over the counter 400mg twice a day for headaches  Can stop into the [facility] office next week Mon-Fri between 8 and 4 to sign and start form for glatiramer acetate  Follow up in 2-3 months  Call for any new symptoms

## 2021-05-13 NOTE — PROGRESS NOTES
Virtual Regular Visit      Assessment/Plan:    Problem List Items Addressed This Visit        Nervous and Auditory    Multiple sclerosis ([facility] [address] ) - Primary     Patient with newly diagnosed MS following a hospitalization in August 2020  At timing of last visit we discussed DMTs and he chose to start Aubagio  He initially said he did not want any injectable meds due to his prior history of IV drug use and did not want any needles in the home  He received the Aubagio but then was concerned about the listed side effect of peripheral neuropathy, which was explained to him is not a common side effect, however he did not want to take it  Also did not like the idea of taking pills every day  He asked for more info on injectables  We extensively discussed the injectable meds today, specifically Copaxone and the Interferons  Reviewed side effects, safety and monitoring with these meds  He is most interested in Copaxone (discussed he would be given generic glatiramer acetate)  He will come to our [place] office next week and sign a start form  He is using gabapentin for neuropathic pain    He does report some more frequent headaches lately, which is unusual for him  One headache he had sounded migrainous with N/V  I suggested he try magnesium oxide 400mg BID  Will update MRI brain as it has been nearly 6 months since his last and he still has not started DMT  This will act as a better baseline  Will see him back in the office in 3 months or sooner if needed  He was advised to call for any new or worsening symptoms           Relevant Orders    MRI brain MS wo and w contrast               Reason for visit is   Chief Complaint   Patient presents with    Virtual Regular Visit        Encounter provider Selena Kaufman PA-C    Provider located at [address] [facility] [address]      Recent Visits  Date Type Provider Dept   05/13/21 Telemedicine Wily Aldirdge PA-C [facility]   Showing recent visits within past 7 days and meeting all other requirements     Future Appointments  No visits were found meeting these conditions  Showing future appointments within next 150 days and meeting all other requirements        The patient was identified by name and date of birth  Contreras Wright was informed that this is a telemedicine visit and that the visit is being conducted through [address] [facility] and patient was informed that this is a secure, HIPAA-compliant platform  He agrees to proceed     My office door was closed  No one else was in the room  He acknowledged consent and understanding of privacy and security of the video platform  The patient has agreed to participate and understands they can discontinue the visit at any time  Patient is aware this is a billable service  Subjective      HPI   Patient is a 75-year-old male who presents today for follow up of newly diagnosed MS  He was last seen via telemedicine on 2/19/2021  Patient had initially presented to [facility] on 8/23/20 with generalized weakness x 2 days  Prior to admission his left side appeared weaker than the right, he was falling to the left with ambulation, and had mild speech difficulties  CT head showed a white matter hypodensity in the left frontal region which could represent area of demyelination or prior infarct  No acute changes  CTA head and neck with no occlusive disease  Neurology was consulted  On exam he was found found to have left upper and lower extremity ataxia  MRI brain demonstrated multiple ovoid bilateral subcortical and periventricular T2 signal lesions oriented perpendicular to the ventricular system  Single similar T2 lesion oriented in the right anterior inferior lateral thalamus  Repeat study with contrast did not demonstrate any enhancement    MRI C-spine demonstrated T2 lesions compatible with a history of MS in the left lateral cord at C2-C3, mid cord at C3-C4 small right anterior lesion at C4  There is no abnormal enhancement  MRI T-spine demonstrated a healed T7 vertebral body fracture with hardware  There is no clear T2 signal abnormalities noted within the thoracic cord  There is no abnormal enhancement  LP was performed  Basic CSF studies were unremarkable including glucose, protein, rbc's, wbc's  MS panel was positive with >5 oligoclonal bands not present in the serum, elevated IgG synthesis rate, normal MBP  He completed 5 days of high-dose Solu-Medrol 1 g daily  He was told he met the criteria for MS diagnosis  At time of discharge, the plan was for him to follow with [facility]  He did not follow-up with [facility] due to insurance reasons  Patient then presented to [facility] on 12/05/2020 complaining of painful pressure and dysthesias in the right distal upper extremity exacerbated with use  Neurology was consulted  It was suspected that he had a peripheral nerve issue such as carpal tunnel syndrome  It was recommended that he obtain MRI brain outpatient (it was not available at the time he was admitted), and EMG of the right upper extremity  MRI brain with and without contrast was completed on 12/15/2020 and demonstrated a moderate degree of demyelinating disease with chronic appearing lesions  There was 1 enhancing subcentimeter lesion in the paramedian anterior left frontal lobe  EMG of the right upper extremity was completed on 12/11/2020 and demonstrated a mild carpal tunnel syndrome  Patient reported a family history of MS in his mother, who he has no contact with  He is not sure what her course has been like  Patient reported that over the years he has had multiple symptoms that could have been concerning for a neurologic disorder including sensory and motor dysfunction of different limbs at different times, some increased frequency and urgency of urination, some falls    He reported he had heavy drug use and probably ignored most of it or did not feel most of it to the extent he would have if he was not high  He has been clean and sober x nearly 2 years  He currently reports some weakness in the extremities at times, as well as sensory changes  He says that when he is in a hot shower he has dysesthesias in the upper extremities particularly  He denies any vision changes  He denies any swallowing difficulty  He is a   At timing of his hospital follow up visit we discussed disease modifying therapy  He was adamant about no injectable meds given prior drug use and having needles in the house would be a trigger for him  We discussed oral meds  He is JCV positive with index 1 48  He was most interested in [place]  Since last visit, [place] was approved, however patient then became reluctant to take the medication  He is specifically worried about the potential side effect of peripheral neuropathy, which is not a common side effect  He was informed that this is a rare side effect  He then said he did not want to take a pill every day and inquired about injectable meds although he initially did not want to take them  He was given info on Copaxone and Interferons to looks over  He currently denies any new symptoms except he has gotten some headaches recently  He had one bad headache a few weeks ago which caused some nausea and vomiting, but nothing like that since          Past Medical History:   Diagnosis Date    [facility]        Past Surgical History:   Procedure Laterality Date    BACK SURGERY      HAND SURGERY Right 2011       Current Outpatient Medications   Medication Sig Dispense Refill    ergocalciferol (VITAMIN D2) 50,000 units Take 1 capsule (50,000 Units total) by mouth once a week 8 capsule 0    gabapentin (NEURONTIN) 300 mg capsule Take 1 capsule AM and 2 capsules PM (Patient taking differently: 300 mg Take 2 capsule AM and 1 capsules PM) 90 capsule 3    NON FORMULARY Medical Marijuana - daily prn      nicotine (NICODERM CQ) 21 mg/24 hr TD 24 hr patch        No current facility-administered medications for this visit  No Known Allergies    Review of Systems   Constitutional: Positive for fatigue  Negative for appetite change and fever  HENT: Negative  Negative for hearing loss, tinnitus, trouble swallowing and voice change  Eyes: Negative  Negative for photophobia and pain  Respiratory: Negative  Negative for shortness of breath  Cardiovascular: Negative  Negative for palpitations  Gastrointestinal: Negative  Negative for nausea and vomiting  Endocrine: Negative  Negative for cold intolerance  Genitourinary: Negative  Negative for dysuria, frequency and urgency  Musculoskeletal: Negative  Negative for myalgias and neck pain  Skin: Negative  Negative for rash  Allergic/Immunologic: Negative  Neurological: Positive for weakness (bilateral legs) and headaches  Negative for dizziness, tremors, seizures, syncope, facial asymmetry, speech difficulty, light-headedness and numbness  Hematological: Negative  Does not bruise/bleed easily  Psychiatric/Behavioral: Positive for sleep disturbance (trouble falling asleep)  Negative for confusion and hallucinations  Memory issues       Video Exam    Vitals:    05/13/21 0946   Weight: 77 1 kg (170 lb)   Height: 5' 8" (1 727 m)       Physical Exam  Constitutional:       Appearance: Normal appearance  HENT:      Head: Normocephalic and atraumatic  Neurological:      Mental Status: He is alert and oriented to person, place, and time  Cranial Nerves: No cranial nerve deficit  Motor: No weakness        Coordination: Coordination normal       Gait: Gait normal    Psychiatric:         Mood and Affect: Mood normal          Behavior: Behavior normal           I spent 20 minutes directly with the patient during this visit      VIRTUAL VISIT DISCLAIMER    Percy Cooks acknowledges that he has consented to an online visit or consultation  He understands that the online visit is based solely on information provided by him, and that, in the absence of a face-to-face physical evaluation by the physician, the diagnosis he receives is both limited and provisional in terms of accuracy and completeness  This is not intended to replace a full medical face-to-face evaluation by the physician  Dickson Cooks understands and accepts these terms

## 2021-05-17 NOTE — ASSESSMENT & PLAN NOTE
Patient with newly diagnosed MS following a hospitalization in August 2020  At timing of last visit we discussed DMTs and he chose to start Aubagio  He initially said he did not want any injectable meds due to his prior history of IV drug use and did not want any needles in the home  He received the Aubagio but then was concerned about the listed side effect of peripheral neuropathy, which was explained to him is not a common side effect, however he did not want to take it  Also did not like the idea of taking pills every day  He asked for more info on injectables  We extensively discussed the injectable meds today, specifically Copaxone and the Interferons  Reviewed side effects, safety and monitoring with these meds  He is most interested in Copaxone (discussed he would be given generic glatiramer acetate)  He will come to our [place] office next week and sign a start form  He is using gabapentin for neuropathic pain    He does report some more frequent headaches lately, which is unusual for him  One headache he had sounded migrainous with N/V  I suggested he try magnesium oxide 400mg BID  Will update MRI brain as it has been nearly 6 months since his last and he still has not started DMT  This will act as a better baseline  Will see him back in the office in 3 months or sooner if needed  He was advised to call for any new or worsening symptoms

## 2021-05-21 ENCOUNTER — TELEPHONE (OUTPATIENT)
Dept: NEUROLOGY | Facility: CLINIC | Age: 41
End: 2021-05-21

## 2021-05-21 NOTE — TELEPHONE ENCOUNTER
Received call from Annette of [employer] [phone]  She is calling to confirm that we did not send Rx to specialty pharmacy yet  Advised her form was just signed and submitted today and we had not sent any scripts to pharmacy  [place] will call patient's insurance to see if a PA is required  She will notify our office either way

## 2021-06-02 ENCOUNTER — HOSPITAL ENCOUNTER (OUTPATIENT)
Dept: MRI IMAGING | Facility: HOSPITAL | Age: 41
Discharge: HOME | End: 2021-06-02
Payer: COMMERCIAL

## 2021-06-02 DIAGNOSIS — G35 MULTIPLE SCLEROSIS (HCC): ICD-10-CM

## 2021-06-02 PROCEDURE — A9585 GADOBUTROL INJECTION: HCPCS | Performed by: PHYSICIAN ASSISTANT

## 2021-06-02 PROCEDURE — 70553 MRI BRAIN STEM W/O & W/DYE: CPT

## 2021-06-02 PROCEDURE — G1004 CDSM NDSC: HCPCS

## 2021-06-02 RX ADMIN — GADOBUTROL 7 ML: 604.72 INJECTION INTRAVENOUS at 18:59

## 2021-06-09 ENCOUNTER — TELEPHONE (OUTPATIENT)
Dept: NEUROLOGY | Facility: CLINIC | Age: 41
End: 2021-06-09

## 2021-06-09 NOTE — TELEPHONE ENCOUNTER
Pt made aware  He denies any new symptoms  States he feels about the same as usual  Denies any numbness, tingling, weakness, visual, changes, bowel/bladder changes, or any other complaints  States he does have problems with balance and may be noticing this more recently but does not feel it is any worse than usual      Pt reports he started glatiramer acetate injections on 6/4/21  Injection sites were itchy at first but injection today was not bad  Feels he is tolerating the medication well so far  Pt states he is okay with steroid infusions  Would like to discuss this with his wife first  Did discuss typical process for outpatient infusions as well as common side effects from steroid infusions  Pt will call back  Kati Singh, please advise on steroid treatment (# of days, dose)  Thanks!

## 2021-06-09 NOTE — TELEPHONE ENCOUNTER
----- Message from Chinyere Valle PA-C sent at 6/9/2021  1:57 PM EDT -----  Please let patient know there are new lesions on MRI brain, 2 of them with enhancement indicating active demyelination  He is not yet on DMT, we are getting him on Copaxone    Please see if any new symptoms and if he would like steroids

## 2021-06-10 DIAGNOSIS — G35 MULTIPLE SCLEROSIS (HCC): Primary | ICD-10-CM

## 2021-06-10 RX ORDER — SODIUM CHLORIDE 9 MG/ML
20 INJECTION, SOLUTION INTRAVENOUS ONCE
Status: CANCELLED | OUTPATIENT
Start: 2021-06-14

## 2021-06-10 NOTE — TELEPHONE ENCOUNTER
Spoke with the patient and made him aware of below  He confirms he spoke with his wife and he would like to proceed with IV steroids  Prefers the [facility] infusion center  Per [employer], [id number], [id number], and [id number] do not require PA  Therapy plan entered and sent to Dr HIRSCH for signature (has been cosigning notes)  TT sent  Referral updated  Awaiting plan signature

## 2021-06-10 NOTE — TELEPHONE ENCOUNTER
Plan is signed  Called [facility] infusion center and spoke with Kannan [address] is scheduled for the followin21 @ 11:30am  6/15/21 @ 11:30am  21 @ 11:30am    Pt made aware, he verbalizes understanding

## 2021-06-14 ENCOUNTER — HOSPITAL ENCOUNTER (OUTPATIENT)
Dept: INFUSION CENTER | Facility: HOSPITAL | Age: 41
Discharge: HOME | End: 2021-06-14
Attending: PSYCHIATRY & NEUROLOGY
Payer: COMMERCIAL

## 2021-06-14 VITALS
DIASTOLIC BLOOD PRESSURE: 77 MMHG | RESPIRATION RATE: 14 BRPM | HEART RATE: 87 BPM | TEMPERATURE: 98.5 F | SYSTOLIC BLOOD PRESSURE: 119 MMHG | OXYGEN SATURATION: 96 %

## 2021-06-14 DIAGNOSIS — G35 MULTIPLE SCLEROSIS (HCC): Primary | ICD-10-CM

## 2021-06-14 PROCEDURE — 96365 THER/PROPH/DIAG IV INF INIT: CPT

## 2021-06-14 RX ORDER — SODIUM CHLORIDE 9 MG/ML
20 INJECTION, SOLUTION INTRAVENOUS ONCE
Status: CANCELLED | OUTPATIENT
Start: 2021-06-15

## 2021-06-14 RX ORDER — SODIUM CHLORIDE 9 MG/ML
20 INJECTION, SOLUTION INTRAVENOUS ONCE
Status: COMPLETED | OUTPATIENT
Start: 2021-06-14 | End: 2021-06-14

## 2021-06-14 RX ADMIN — SODIUM CHLORIDE 1000 MG: 0.9 INJECTION, SOLUTION INTRAVENOUS at 11:52

## 2021-06-14 RX ADMIN — SODIUM CHLORIDE 20 ML/HR: 9 INJECTION, SOLUTION INTRAVENOUS at 11:50

## 2021-06-14 NOTE — PROGRESS NOTES
Pt tolerated infusion well with no reactions  PIV removed and bandage applied  Next appts scheduled  Pt left ambulatory with steady gait for d/c to home

## 2021-06-15 ENCOUNTER — HOSPITAL ENCOUNTER (OUTPATIENT)
Dept: INFUSION CENTER | Facility: HOSPITAL | Age: 41
Discharge: HOME | End: 2021-06-15
Attending: PSYCHIATRY & NEUROLOGY
Payer: COMMERCIAL

## 2021-06-15 VITALS
TEMPERATURE: 98.3 F | RESPIRATION RATE: 14 BRPM | OXYGEN SATURATION: 97 % | DIASTOLIC BLOOD PRESSURE: 71 MMHG | SYSTOLIC BLOOD PRESSURE: 123 MMHG | HEART RATE: 88 BPM

## 2021-06-15 DIAGNOSIS — G35 MULTIPLE SCLEROSIS (HCC): Primary | ICD-10-CM

## 2021-06-15 PROCEDURE — 96365 THER/PROPH/DIAG IV INF INIT: CPT

## 2021-06-15 RX ORDER — SODIUM CHLORIDE 9 MG/ML
20 INJECTION, SOLUTION INTRAVENOUS ONCE
Status: COMPLETED | OUTPATIENT
Start: 2021-06-15 | End: 2021-06-15

## 2021-06-15 RX ORDER — GLATIRAMER 40 MG/ML
INJECTION, SOLUTION SUBCUTANEOUS
COMMUNITY
Start: 2021-05-25 | End: 2021-07-09 | Stop reason: ALTCHOICE

## 2021-06-15 RX ORDER — SODIUM CHLORIDE 9 MG/ML
20 INJECTION, SOLUTION INTRAVENOUS ONCE
Status: CANCELLED | OUTPATIENT
Start: 2021-06-16

## 2021-06-15 RX ADMIN — SODIUM CHLORIDE 20 ML/HR: 9 INJECTION, SOLUTION INTRAVENOUS at 12:36

## 2021-06-15 RX ADMIN — SODIUM CHLORIDE 1000 MG: 0.9 INJECTION, SOLUTION INTRAVENOUS at 12:36

## 2021-06-15 NOTE — PROGRESS NOTES
Pt tolerated SoluMedrol infusion with no adverse reaction  Pt left unit ambulatory with steady gait   Pt refused AVS

## 2021-06-16 ENCOUNTER — HOSPITAL ENCOUNTER (OUTPATIENT)
Dept: INFUSION CENTER | Facility: HOSPITAL | Age: 41
Discharge: HOME | End: 2021-06-16
Attending: PSYCHIATRY & NEUROLOGY
Payer: COMMERCIAL

## 2021-06-16 VITALS
DIASTOLIC BLOOD PRESSURE: 71 MMHG | TEMPERATURE: 98.8 F | RESPIRATION RATE: 14 BRPM | SYSTOLIC BLOOD PRESSURE: 130 MMHG | OXYGEN SATURATION: 97 % | HEART RATE: 75 BPM

## 2021-06-16 DIAGNOSIS — G35 MULTIPLE SCLEROSIS (HCC): Primary | ICD-10-CM

## 2021-06-16 PROCEDURE — 96365 THER/PROPH/DIAG IV INF INIT: CPT

## 2021-06-16 RX ORDER — SODIUM CHLORIDE 9 MG/ML
20 INJECTION, SOLUTION INTRAVENOUS ONCE
Status: CANCELLED | OUTPATIENT
Start: 2021-06-16

## 2021-06-16 RX ORDER — SODIUM CHLORIDE 9 MG/ML
20 INJECTION, SOLUTION INTRAVENOUS ONCE
Status: COMPLETED | OUTPATIENT
Start: 2021-06-16 | End: 2021-06-16

## 2021-06-16 RX ADMIN — SODIUM CHLORIDE 20 ML/HR: 9 INJECTION, SOLUTION INTRAVENOUS at 11:50

## 2021-06-16 RX ADMIN — SODIUM CHLORIDE 1000 MG: 0.9 INJECTION, SOLUTION INTRAVENOUS at 12:03

## 2021-06-16 NOTE — PROGRESS NOTES
Pt here for Solumedrol IV day 3  PIV inserted, NSS to kvo, Solumedrol infused  Pt joshua well  PIV removed intact  Disch amb to home, steady gait  ,  Pt did not want AVS

## 2021-07-03 ENCOUNTER — NURSE TRIAGE (OUTPATIENT)
Dept: OTHER | Facility: OTHER | Age: 41
End: 2021-07-03

## 2021-07-03 NOTE — TELEPHONE ENCOUNTER
TC to on-call, "Pt is taking Glatiramer Acetate 40 MG/ML SOSY since June 4th with minimal redness following injection and symptoms resolving in 30 minutes  Pt received next batch of medication from pharmacy yesterday 7/2 and injected first dose into abdomen  Soon after had swelling and redness at injection site which is now baseball sized as well as hives over arms, abdomen, and genitals  Denies fevers  Pt took Benadryl 50mg last night before bed with some relief  Did not take this AM due to having to work today  States he is very itchy and now the injection site is also a hard lump  How should I advise? Per provider pt advised to take additional dose of Benadryl now and to seek evaluation in ER  Pt advised to hold further medication doses until evaluated by Neurology team, pt will call office Tuesday morning to schedule appointment  Spoke with Pt's significant other (listed on consent) agreeable and understanding of plan  Reason for Disposition   [1] Taking new prescription medication AND [2] rash within 4 hours of 1st dose    Answer Assessment - Initial Assessment Questions  1  NAME of MEDICATION: "What medicine are you calling about?"      Glatiramer Acetate 40 MG/ML SOSY  2  QUESTION: "What is your question?" (e g , medication refill, side effect)      Was taking previously for a month starting June 4th with no problems,only symptom was redness at site for 30 minutes  July 2nd was first injection out of new box  3  PRESCRIBING HCP: "Who prescribed it?" Reason: if prescribed by specialist, call should be referred to that group  Homer Lucio  4  SYMPTOMS: "Do you have any symptoms?"      Injection site is red swollen and hard in stomach, rash on arms and genitals  Large red patch    5  SEVERITY: If symptoms are present, ask "Are they mild, moderate or severe?"    Answer Assessment - Initial Assessment Questions  1   APPEARANCE of RASH: "Describe the rash " (e g , spots, blisters, raised areas, skin peeling, scaly)      Red raised and marco antonio  2  SIZE: "How big are the spots?" (e g , tip of pen, eraser, coin; inches, centimeters)      Smaller to baseball sized  3  LOCATION: "Where is the rash located?"      Stomach reddened/ swollen bigger than hand, arm, genitals  4  COLOR: "What color is the rash?" (Note: It is difficult to assess rash color in people with darker-colored skin  When this situation occurs, simply ask the caller to describe what they see )      red  5  ONSET: "When did the rash begin?"      Yesterday  6  FEVER: "Do you have a fever?" If Yes, ask: "What is your temperature, how was it measured, and when did it start?"      Denies  7  ITCHING: "Does the rash itch?" If Yes, ask: "How bad is the itch?" (Scale 1-10; or mild, moderate, severe)      Yes, Mod-Sev  8  CAUSE: "What do you think is causing the rash?"      Maybe medication  9  NEW MEDICATION: "What new medication are you taking?" (e g , name of antibiotic) "When did you start taking this medication?"  Glatiramer Acetate 40 MG/ML SOSY  10  OTHER SYMPTOMS: "Do you have any other symptoms?" (e g , sore throat, fever, joint pain)        Denies    Benadryl last night 50mg      Protocols used: RASH - WIDESPREAD ON DRUGS-ADULT-, MEDICATION QUESTION CALL-ADULT-

## 2021-07-06 NOTE — TELEPHONE ENCOUNTER
Spoke w/patient  He denies any SOB, swelling of lips/tongue or difficulty swallowing after or since his 7/2 injection  Patient verbalized understanding not to resume his Glatiramer Acetate medication  Patient states he will start Prednisone script today

## 2021-07-06 NOTE — TELEPHONE ENCOUNTER
Patient spoke with [facility] on 7/2 (please see below) due to reaction after taking Glatiramer Acetate  Patient has had injection site reactions (redness, itching) since beginning Glatiramer Acetate at the beginning of June but they typically resolve w/in 30 minutes  Called patient to further triage  Patient requested I speak w/his wife Yaron Berkowitz (on consent per chart review)  Reviewed [facility] info w/wife  All information correct  Wife states red hives on patient's stomach were raised on Friday night  She gave patient Benadryl 50mg Friday night with some relief of symptoms  Patient went to Urgent Care on Saturday morning  Wife states rash was improved at that time  Provider prescribed Prednisone to patient with instructions to take if rash/symptoms worsened  Patient has remained afebrile  Wife states patient's arms and stomach are improved today; however, patient's genitals are still extremely itchy  Patient takes Glatiramer Acetate 40mg injections M, W, F  He did not take injection yesterday  Adin Baldwin - please advise

## 2021-07-06 NOTE — TELEPHONE ENCOUNTER
Would like to see him sooner than his visit in August if possible  Can it be moved up?   Need to discuss alternative DMT

## 2021-07-06 NOTE — TELEPHONE ENCOUNTER
It sounds like this is not an injection site reaction, but he actually had more diffuse hives  Initially he was having a common reaction with redness at injection site, but that was it  Any SOB, swelling of lips/tongue, difficulty swallowing after the injection on 7/2? I would take the prednisone Rx'd by UC as he is still having some itching  Would hold the medication and not resume

## 2021-07-08 NOTE — TELEPHONE ENCOUNTER
Added patient per [facility] tomorrow 7/9/21 in the [facility] office at 1 pm

## 2021-07-09 ENCOUNTER — OFFICE VISIT (OUTPATIENT)
Dept: NEUROLOGY | Facility: CLINIC | Age: 41
End: 2021-07-09
Payer: COMMERCIAL

## 2021-07-09 VITALS
HEIGHT: 68 IN | DIASTOLIC BLOOD PRESSURE: 72 MMHG | HEART RATE: 97 BPM | BODY MASS INDEX: 26.22 KG/M2 | WEIGHT: 173 LBS | SYSTOLIC BLOOD PRESSURE: 131 MMHG

## 2021-07-09 DIAGNOSIS — R51.9 HEADACHE: ICD-10-CM

## 2021-07-09 DIAGNOSIS — G35 MULTIPLE SCLEROSIS (HCC): Primary | ICD-10-CM

## 2021-07-09 PROCEDURE — 99214 OFFICE O/P EST MOD 30 MIN: CPT | Performed by: PHYSICIAN ASSISTANT

## 2021-07-09 RX ORDER — OMEPRAZOLE 40 MG/1
CAPSULE, DELAYED RELEASE ORAL
COMMUNITY
Start: 2021-06-17 | End: 2022-04-22

## 2021-07-09 RX ORDER — NICOTINE 21 MG/24HR
PATCH, TRANSDERMAL 24 HOURS TRANSDERMAL
COMMUNITY
Start: 2021-05-17

## 2021-07-09 RX ORDER — PREDNISONE 10 MG/1
30 TABLET ORAL EVERY 12 HOURS
COMMUNITY
Start: 2021-07-03 | End: 2021-08-20

## 2021-07-09 NOTE — PROGRESS NOTES
Patient ID: Montrell Winn is a 41 y o  male  Assessment/Plan:    Multiple sclerosis [facility])  Patient with MS diagnosed following a hospitalization in August 2020  DMTs were discussed and he initially chose to start Aubagio  He received the Aubagio but then was concerned about the listed side effect of peripheral neuropathy, which was explained to him is not a common side effect, however he did not want to take it  He asked for information on injectable medications, although at 1st was adamant about not taking injections due to his history of IV drug use  We decided on Copaxone, did receive glatiramer acetate  He had some local site reactions initially, which were expected, however after his injection on 07/02, he had a more severe reaction at the injection site, as well as hives on his arms and abdomen and itching in his genitals  He went to [facility] and was prescribed an oral prednisone taper  Currently feeling better  Also of note he had updated MRI brain 06/02/2021 which demonstrated two new, enhancing lesions  He denied any new focal symptoms, however was given 3 days of IV steroids in mid June 2021  He tolerated the steroids well  We discussed alternative DMTs today  He is still unwilling to try the Aubagio due to concern for peripheral neuropathy is a listed side effect  We discussed other oral medications and he is interested in [place]  Start form was signed today  He is JCV positive  He understands labs will need to be monitored, specifically his absolute lymphocyte level  Common side effects were discussed  He is using gabapentin for neuropathic pain  His neurologic exam is stable today  He will follow up in 2 months or sooner if needed  He was advised to call the office for any new or worsening symptoms  Diagnoses and all orders for this visit:    Multiple sclerosis ([facility] [address] )  -     CBC and differential; Future  -     Comprehensive metabolic panel;  Future  -     CBC and differential  -     Comprehensive metabolic panel    Headache  -     magnesium oxide (MAG-OX) 400 mg; Take 1 tablet (400 mg total) by mouth 2 (two) times a day    Other orders             Subjective:    HPI    Francis Friedmna is a 75-year-old male who presents today for follow up  He was last seen via telemedicine on 5/13/2021  Patient had initially presented to [facility] on 8/[facility]20 with generalized weakness x 2 days  Prior to admission his left side appeared weaker than the right, he was falling to the left with ambulation, and had mild speech difficulties  CT head showed a white matter hypodensity in the left frontal region which could represent area of demyelination or prior infarct  No acute changes  CTA head and neck with no occlusive disease  Neurology was consulted  On exam he was found found to have left upper and lower extremity ataxia  MRI brain demonstrated multiple ovoid bilateral subcortical and periventricular T2 signal lesions oriented perpendicular to the ventricular system  Single similar T2 lesion oriented in the right anterior inferior lateral thalamus  Repeat study with contrast did not demonstrate any enhancement  MRI C-spine demonstrated T2 lesions compatible with a history of MS in the left lateral cord at C2-C3, mid cord at C3-C4 small right anterior lesion at C4  There is no abnormal enhancement  MRI T-spine demonstrated a healed T7 vertebral body fracture with hardware  There is no clear T2 signal abnormalities noted within the thoracic cord  There is no abnormal enhancement  LP was performed  Basic CSF studies were unremarkable including glucose, protein, rbc's, wbc's  MS panel was positive with >5 oligoclonal bands not present in the serum, elevated IgG synthesis rate, normal MBP  He completed 5 days of high-dose Solu-Medrol 1 g daily  He was told he met the criteria for MS diagnosis  At time of discharge, the plan was for him to follow with [facility]   He did not follow-up with [facility] due to insurance reasons  Patient then presented to [facility] on 12/05/2020 complaining of painful pressure and dysthesias in the right distal upper extremity exacerbated with use  Neurology was consulted  It was suspected that he had a peripheral nerve issue such as carpal tunnel syndrome  It was recommended that he obtain MRI brain outpatient (it was not available at the time he was admitted), and EMG of the right upper extremity  MRI brain with and without contrast was completed on 12/15/2020 and demonstrated a moderate degree of demyelinating disease with chronic appearing lesions  There was 1 enhancing subcentimeter lesion in the paramedian anterior left frontal lobe  EMG of the right upper extremity was completed on 12/11/2020 and demonstrated a mild carpal tunnel syndrome  Patient reported a family history of MS in his mother, who he has no contact with  He is not sure what her course has been like  Patient reported that over the years he has had multiple symptoms that could have been concerning for a neurologic disorder including sensory and motor dysfunction of different limbs at different times, some increased frequency and urgency of urination, some falls  He reported he had heavy drug use and probably ignored most of it or did not feel most of it to the extent he would have if he was not high  He has been clean and sober x 2 years  At timing of his hospital follow up visit we discussed disease modifying therapy  He was adamant about no injectable meds given prior drug use and having needles in the house would be a trigger for him  We discussed oral meds  He is JCV positive with index 1 48  He was most interested in [place]  Aubagio was approved, but patient then became reluctant to take the medication  He is specifically worried about the potential side effect of peripheral neuropathy, which is not a common side effect    He was informed that this is a rare side effect  He then said he did not want to take a pill every day and inquired about injectable meds although he initially did not want to take them  He was started on Copaxone  MRI brain was completed 06/02/2021 for surveillance and for a new baseline, and this demonstrated for new foci of demyelination, 2 of which demonstrated enhancement  He denied any new, focal symptoms but was given 3 days of IV steroids in mid June 2021  Tolerated the steroids well  Patient started Copaxone in early June 2021  He initially had some local injection site redness, otherwise did ok  On 7/3/21  He called the office over the weekend reporting a more severe reaction with his injection  He injected on 07/02 into the abdomen  Soon after he had swelling and redness at the injection site, which by the next day was "baseball sized", as well as he had hives over his arms abdomen and itching in his genital area  He took Benadryl the night after his injection and did notice a little bit of improvement the next day  He was advised to go to urgent care/ ER  He went to urgent care the next morning and was prescribed an oral prednisone taper  He was advised to hold his glatiramer acetate  Today, patient reports things have improved in terms of the reaction from his glatiramer acetate injection  He does not wish to trial the medication anymore  He denies any new neurologic symptoms  He had no issues with the steroid infusions in June  The following portions of the patient's history were reviewed and updated as appropriate: current medications, past family history, past medical history, past social history, past surgical history and problem list          Objective:    Blood pressure 131/72, pulse 97, height 5' 8" (1 727 m), weight 78 5 kg (173 lb)  Physical Exam  Constitutional:       Appearance: Normal appearance  HENT:      Head: Normocephalic and atraumatic     Eyes: Extraocular Movements: EOM normal       Pupils: Pupils are equal, round, and reactive to light  Skin:     General: Skin is warm and dry  Neurological:      Mental Status: He is alert  Coordination: Coordination is intact  Deep Tendon Reflexes: Strength normal and reflexes are normal and symmetric  Psychiatric:         Mood and Affect: Mood normal          Speech: Speech normal          Behavior: Behavior normal          Neurological Exam  Mental Status  Alert  Oriented to person, place, time and situation  Recent and remote memory are intact  Speech is normal  Language is fluent with no aphasia  Attention and concentration are normal     Cranial Nerves  CN II: Visual fields full to confrontation  CN III, IV, VI: Extraocular movements intact bilaterally  Pupils equal round and reactive to light bilaterally  CN V: Facial sensation is normal   CN VII: Full and symmetric facial movement  CN VIII: Hearing is normal   CN IX, X: Palate elevates symmetrically  CN XI: Shoulder shrug strength is normal   CN XII: Tongue midline without atrophy or fasciculations  Motor   Normal muscle tone  Strength is 5/5 throughout all four extremities  Sensory  Sensation is intact to light touch, pinprick, vibration and proprioception in all four extremities  Reflexes  Deep tendon reflexes are 2+ and symmetric in all four extremities with downgoing toes bilaterally  Coordination  Finger-to-nose, rapid alternating movements and heel-to-shin normal bilaterally without dysmetria  Gait  Casual gait is normal including stance, stride, and arm swing  ROS:    Review of Systems   Constitutional: Negative  Negative for appetite change and fever  HENT: Negative  Negative for hearing loss, tinnitus, trouble swallowing and voice change  Eyes: Negative  Negative for photophobia and pain  Respiratory: Negative  Negative for shortness of breath  Cardiovascular: Negative  Negative for palpitations  Gastrointestinal: Negative  Negative for nausea and vomiting  Endocrine: Negative  Negative for cold intolerance  Genitourinary: Negative  Negative for dysuria, frequency and urgency  Musculoskeletal: Negative  Negative for myalgias and neck pain  Skin: Negative  Negative for rash  Neurological: Negative  Negative for dizziness, tremors, seizures, syncope, facial asymmetry, speech difficulty, weakness, light-headedness, numbness and headaches  Hematological: Negative  Does not bruise/bleed easily  Psychiatric/Behavioral: Negative  Negative for confusion, hallucinations and sleep disturbance       I personally reviewed and updated the ROS as appropriate

## 2021-07-09 NOTE — PATIENT INSTRUCTIONS
Will discontinue Copaxone (glatiramer acetate)  Start form signed for Dian Biswas, will submit to insurance  Please get labs done at this time  Follow up in August as scheduled, or sooner if needed

## 2021-07-12 NOTE — ASSESSMENT & PLAN NOTE
Patient with MS diagnosed following a hospitalization in August 2020  DMTs were discussed and he initially chose to start Aubagio  He received the Aubagio but then was concerned about the listed side effect of peripheral neuropathy, which was explained to him is not a common side effect, however he did not want to take it  He asked for information on injectable medications, although at 1st was adamant about not taking injections due to his history of IV drug use  We decided on Copaxone, did receive glatiramer acetate  He had some local site reactions initially, which were expected, however after his injection on 07/02, he had a more severe reaction at the injection site, as well as hives on his arms and abdomen and itching in his genitals  He went to [facility] and was prescribed an oral prednisone taper  Currently feeling better  Also of note he had updated MRI brain 06/02/2021 which demonstrated two new, enhancing lesions  He denied any new focal symptoms, however was given 3 days of IV steroids in mid June 2021  He tolerated the steroids well  We discussed alternative DMTs today  He is still unwilling to try the Aubagio due to concern for peripheral neuropathy is a listed side effect  We discussed other oral medications and he is interested in [place]  Start form was signed today  He is JCV positive  He understands labs will need to be monitored, specifically his absolute lymphocyte level  Common side effects were discussed  He is using gabapentin for neuropathic pain  His neurologic exam is stable today  He will follow up in 2 months or sooner if needed  He was advised to call the office for any new or worsening symptoms

## 2021-07-13 LAB
ALBUMIN SERPL-MCNC: 4.2 G/DL (ref 4–5)
ALBUMIN/GLOB SERPL: 1.8 {RATIO} (ref 1.2–2.2)
ALP SERPL-CCNC: 43 IU/L (ref 48–121)
ALT SERPL-CCNC: 14 IU/L (ref 0–44)
AST SERPL-CCNC: 12 IU/L (ref 0–40)
BASOPHILS # BLD AUTO: 0.1 X10E3/UL (ref 0–0.2)
BASOPHILS NFR BLD AUTO: 1 %
BILIRUB SERPL-MCNC: <0.2 MG/DL (ref 0–1.2)
BUN SERPL-MCNC: 14 MG/DL (ref 6–24)
BUN/CREAT SERPL: 18 (ref 9–20)
CALCIUM SERPL-MCNC: 9.5 MG/DL (ref 8.7–10.2)
CHLORIDE SERPL-SCNC: 107 MMOL/L (ref 96–106)
CO2 SERPL-SCNC: 23 MMOL/L (ref 20–29)
CREAT SERPL-MCNC: 0.76 MG/DL (ref 0.76–1.27)
EOSINOPHIL # BLD AUTO: 0.3 X10E3/UL (ref 0–0.4)
EOSINOPHIL NFR BLD AUTO: 3 %
ERYTHROCYTE [DISTWIDTH] IN BLOOD BY AUTOMATED COUNT: 13.3 % (ref 11.6–15.4)
GLOBULIN SER-MCNC: 2.3 G/DL (ref 1.5–4.5)
GLUCOSE SERPL-MCNC: 91 MG/DL (ref 65–99)
HCT VFR BLD AUTO: 42.8 % (ref 37.5–51)
HGB BLD-MCNC: 14.3 G/DL (ref 13–17.7)
IMM GRANULOCYTES # BLD: 0.1 X10E3/UL (ref 0–0.1)
IMM GRANULOCYTES NFR BLD: 1 %
LYMPHOCYTES # BLD AUTO: 3.5 X10E3/UL (ref 0.7–3.1)
LYMPHOCYTES NFR BLD AUTO: 32 %
MCH RBC QN AUTO: 30.8 PG (ref 26.6–33)
MCHC RBC AUTO-ENTMCNC: 33.4 G/DL (ref 31.5–35.7)
MCV RBC AUTO: 92 FL (ref 79–97)
MONOCYTES # BLD AUTO: 0.9 X10E3/UL (ref 0.1–0.9)
MONOCYTES NFR BLD AUTO: 8 %
NEUTROPHILS # BLD AUTO: 6.1 X10E3/UL (ref 1.4–7)
NEUTROPHILS NFR BLD AUTO: 55 %
PLATELET # BLD AUTO: 405 X10E3/UL (ref 150–450)
POTASSIUM SERPL-SCNC: 4.1 MMOL/L (ref 3.5–5.2)
PROT SERPL-MCNC: 6.5 G/DL (ref 6–8.5)
RBC # BLD AUTO: 4.65 X10E6/UL (ref 4.14–5.8)
SL AMB EGFR AFRICAN AMERICAN: 130 ML/MIN/1.73
SL AMB EGFR NON AFRICAN AMERICAN: 113 ML/MIN/1.73
SODIUM SERPL-SCNC: 143 MMOL/L (ref 134–144)
WBC # BLD AUTO: 11 X10E3/UL (ref 3.4–10.8)

## 2021-07-14 ENCOUNTER — TELEPHONE (OUTPATIENT)
Dept: NEUROLOGY | Facility: CLINIC | Age: 41
End: 2021-07-14

## 2021-07-14 DIAGNOSIS — G35 MULTIPLE SCLEROSIS (HCC): Primary | ICD-10-CM

## 2021-07-14 RX ORDER — DIMETHYL FUMARATE 240 MG/1
CAPSULE ORAL
Qty: 60 CAPSULE | Refills: 5 | Status: SHIPPED | OUTPATIENT
Start: 2021-07-14 | End: 2022-01-28

## 2021-07-14 RX ORDER — DIMETHYL FUMARATE 120-240 MG
KIT ORAL
Qty: 1 EACH | Refills: 0 | Status: SHIPPED | OUTPATIENT
Start: 2021-07-14 | End: 2021-08-20

## 2021-07-14 NOTE — TELEPHONE ENCOUNTER
Spoke with Karen Reyna re: dimethyl fumarate start  She said I can send the Rx to his specialty pharmacy instead of sending in [address] start form    Rx sent for started pack and maintenance dose

## 2021-07-20 NOTE — TELEPHONE ENCOUNTER
PA requests submitted on CMM  Awaiting determination       Key: [id number] starter  Key: [id number] maintenance

## 2021-07-20 NOTE — TELEPHONE ENCOUNTER
Received voicemail from [facility]  He confirms both scripts have been received for dimethyl fumarate and are valid  States PA is needed  Asking for approval to be faxed to [phone] attn Carmen Robison in billing     [phone]

## 2021-07-21 NOTE — TELEPHONE ENCOUNTER
Received fax from [phone] approved from 8/21/21 to 7/21/22  Letter scanned under media

## 2021-07-28 NOTE — TELEPHONE ENCOUNTER
Called [facility] and spoke with Jatin Whitaker  She reports dimethyl fumarate is ready to fill, she will contact the patient to schedule delivery now

## 2021-08-04 NOTE — TELEPHONE ENCOUNTER
Pt confirms he received dimethyl fumarate on 7/30/21 and has started the medication  No further questions at this time

## 2021-08-13 DIAGNOSIS — G35 MULTIPLE SCLEROSIS (HCC): ICD-10-CM

## 2021-08-16 RX ORDER — GABAPENTIN 300 MG/1
CAPSULE ORAL
Qty: 90 CAPSULE | Refills: 3 | Status: SHIPPED | OUTPATIENT
Start: 2021-08-16 | End: 2021-12-17 | Stop reason: SDUPTHER

## 2021-08-20 ENCOUNTER — OFFICE VISIT (OUTPATIENT)
Dept: NEUROLOGY | Facility: CLINIC | Age: 41
End: 2021-08-20
Payer: COMMERCIAL

## 2021-08-20 VITALS
SYSTOLIC BLOOD PRESSURE: 100 MMHG | HEART RATE: 78 BPM | DIASTOLIC BLOOD PRESSURE: 62 MMHG | TEMPERATURE: 96.8 F | BODY MASS INDEX: 26.91 KG/M2 | WEIGHT: 177 LBS

## 2021-08-20 DIAGNOSIS — G35 MULTIPLE SCLEROSIS (HCC): Primary | ICD-10-CM

## 2021-08-20 PROCEDURE — 99214 OFFICE O/P EST MOD 30 MIN: CPT | Performed by: PHYSICIAN ASSISTANT

## 2021-08-20 NOTE — PROGRESS NOTES
Patient ID: Juwan Persaud is a 41 y o  male  Assessment/Plan:    [facility] )  Patient started dimethyl fumarate about 3 weeks ago and is tolerating well  Denies flushing or GI upset  He had some reactions to glatiramer acetate and needed to change medication  We discussed getting routine labs done every other month to monitor absolute lymphocyte count  Standing order provided today  He had imaging done in June which showed disease progression in the brain  This was just prior to starting DMT  Would consider repeat MRI brain in early 2022  His exam is stable today  He will follow up in 4 months or sooner if needed  He was advised to call for any new or worsening symptoms  Diagnoses and all orders for this visit:    Multiple sclerosis ([facility] )  -     CBC and differential; Standing  -     Hepatic function panel; Standing  -     CBC and differential  -     Hepatic function panel           Subjective:    HPI    Juwan Persaud is a 57-year-old male who presents today for follow up  He was last seen on 7/9/21  Patient had initially presented to [facility] on 8/23/20 with generalized weakness x 2 days  Prior to admission his left side appeared weaker than the right, he was falling to the left with ambulation, and had mild speech difficulties  CT head showed a white matter hypodensity in the left frontal region which could represent area of demyelination or prior infarct  No acute changes  CTA head and neck with no occlusive disease  Neurology was consulted  On exam he was found found to have left upper and lower extremity ataxia  MRI brain demonstrated multiple ovoid bilateral subcortical and periventricular T2 signal lesions oriented perpendicular to the ventricular system  Single similar T2 lesion oriented in the right anterior inferior lateral thalamus  Repeat study with contrast did not demonstrate any enhancement    MRI C-spine demonstrated T2 lesions compatible with a history of MS in the left lateral cord at C2-C3, mid cord at C3-C4 small right anterior lesion at C4  There is no abnormal enhancement  MRI T-spine demonstrated a healed T7 vertebral body fracture with hardware  There is no clear T2 signal abnormalities noted within the thoracic cord  There is no abnormal enhancement  LP was performed  Basic CSF studies were unremarkable including glucose, protein, rbc's, wbc's  MS panel was positive with >5 oligoclonal bands not present in the serum, elevated IgG synthesis rate, normal MBP  He completed 5 days of high-dose Solu-Medrol 1 g daily  He was told he met the criteria for MS diagnosis  At time of discharge, the plan was for him to follow with [facility]  He did not follow-up with [facility] due to insurance reasons  Patient then presented to [facility] on 12/05/2020 complaining of painful pressure and dysthesias in the right distal upper extremity exacerbated with use  Neurology was consulted  It was suspected that he had a peripheral nerve issue such as carpal tunnel syndrome  It was recommended that he obtain MRI brain outpatient (it was not available at the time he was admitted), and EMG of the right upper extremity  MRI brain with and without contrast was completed on 12/15/2020 and demonstrated a moderate degree of demyelinating disease with chronic appearing lesions  There was 1 enhancing subcentimeter lesion in the paramedian anterior left frontal lobe  EMG of the right upper extremity was completed on 12/11/2020 and demonstrated a mild carpal tunnel syndrome  Patient reported a family history of MS in his mother, who he has no contact with  He is not sure what her course has been like    Patient reported that over the years he has had multiple symptoms that could have been concerning for a neurologic disorder including sensory and motor dysfunction of different limbs at different times, some increased frequency and urgency of urination, some falls  He reported he had heavy drug use and probably ignored most of it or did not feel most of it to the extent he would have if he was not high  He has been clean and sober x 2 years  At timing of his hospital follow up visit we discussed disease modifying therapy  He was adamant about no injectable meds given prior drug use and having needles in the house would be a trigger for him  We discussed oral meds  He is JCV positive with index 1 48  He was most interested in [place]  Aubagio was approved, but patient then became reluctant to take the medication  He is specifically worried about the potential side effect of peripheral neuropathy, which is not a common side effect  He was informed that this is a rare side effect  He then said he did not want to take a pill every day and inquired about injectable meds although he initially did not want to take them  He was started on Copaxone  MRI brain was completed 06/02/2021 for surveillance and for a new baseline, and this demonstrated for new foci of demyelination, 2 of which demonstrated enhancement  He denied any new, focal symptoms but was given 3 days of IV steroids in mid June 2021  Tolerated the steroids well  Patient started Copaxone in early June 2021  He initially had some local injection site redness, otherwise did ok  On 7/3/21 he called the office over the weekend reporting a more severe reaction with his injection  He injected on 07/02 into the abdomen  Soon after he had swelling and redness at the injection site, which by the next day was "baseball sized", as well as he had hives over his arms abdomen and itching in his genital area  He took Benadryl the night after his injection and did notice a little bit of improvement the next day  He was advised to go to urgent care/ ER  He went to urgent care the next morning and was prescribed an oral prednisone taper    He was advised to discontinue his glatiramer acetate  At timing of 7/9/21 visit, decision was made to start dimethyl fumarate  Today, patient reports he is doing well, denies any new symptoms  He started dimethyl fumarate about 3 weeks ago and is tolerating well  Denies any flushing, GI side effects  The following portions of the patient's history were reviewed and updated as appropriate: current medications, past family history, past medical history, past social history, past surgical history and problem list          Objective:    Blood pressure 100/62, pulse 78, temperature (!) 96 8 °F (36 °C), temperature source Temporal, weight 80 3 kg (177 lb)  Physical Exam  Constitutional:       Appearance: Normal appearance  HENT:      Head: Normocephalic and atraumatic  Eyes:      Extraocular Movements: EOM normal       Pupils: Pupils are equal, round, and reactive to light  Skin:     General: Skin is warm and dry  Neurological:      Mental Status: He is alert  Coordination: Coordination is intact  Deep Tendon Reflexes: Strength normal and reflexes are normal and symmetric  Psychiatric:         Mood and Affect: Mood normal          Speech: Speech normal          Behavior: Behavior normal          Neurological Exam  Mental Status  Alert  Oriented to person, place, time and situation  Speech is normal  Language is fluent with no aphasia  Attention and concentration are normal     Cranial Nerves  CN II: Visual fields full to confrontation  CN III, IV, VI: Extraocular movements intact bilaterally  Pupils equal round and reactive to light bilaterally  CN V: Facial sensation is normal   CN VII: Full and symmetric facial movement  CN VIII: Hearing is normal   CN IX, X: Palate elevates symmetrically  CN XI: Shoulder shrug strength is normal   CN XII: Tongue midline without atrophy or fasciculations  Motor   Normal muscle tone  Strength is 5/5 throughout all four extremities      Sensory  Sensation is intact to light touch, pinprick, vibration and proprioception in all four extremities  Reflexes  Deep tendon reflexes are 2+ and symmetric in all four extremities with downgoing toes bilaterally  Coordination  Finger-to-nose, rapid alternating movements and heel-to-shin normal bilaterally without dysmetria  Gait  Casual gait is normal including stance, stride, and arm swing  ROS:    Review of Systems   Constitutional: Negative  Negative for appetite change and fever  HENT: Negative  Negative for hearing loss, tinnitus, trouble swallowing and voice change  Eyes: Negative  Negative for photophobia and pain  Respiratory: Negative  Negative for shortness of breath  Cardiovascular: Negative  Negative for palpitations  Gastrointestinal: Negative  Negative for nausea and vomiting  Endocrine: Negative  Negative for cold intolerance  Genitourinary: Negative  Negative for dysuria, frequency and urgency  Musculoskeletal: Negative  Negative for myalgias and neck pain  Skin: Negative  Negative for rash  Neurological: Positive for weakness (Knees and ankles) and headaches  Negative for dizziness, tremors, seizures, syncope, facial asymmetry, speech difficulty, light-headedness and numbness  Hematological: Negative  Does not bruise/bleed easily  Psychiatric/Behavioral: Negative  Negative for confusion, hallucinations and sleep disturbance       I personally reviewed and updated the ROS as appropriate

## 2021-08-20 NOTE — ASSESSMENT & PLAN NOTE
Patient started dimethyl fumarate about 3 weeks ago and is tolerating well  Denies flushing or GI upset  He had some reactions to glatiramer acetate and needed to change medication  We discussed getting routine labs done every other month to monitor absolute lymphocyte count  Standing order provided today  He had imaging done in June which showed disease progression in the brain  This was just prior to starting DMT  Would consider repeat MRI brain in early 2022  His exam is stable today  He will follow up in 4 months or sooner if needed  He was advised to call for any new or worsening symptoms

## 2021-08-20 NOTE — PATIENT INSTRUCTIONS
Continue Tecfidera (dimethyl fumarate) 240mg twice a day  Get labs done every other month starting in Sept with standing order  Follow up in 4 months or sooner if needed  Call for any new symptoms

## 2021-09-17 LAB
ALBUMIN SERPL-MCNC: 4.5 G/DL (ref 4–5)
ALP SERPL-CCNC: 44 IU/L (ref 44–121)
ALT SERPL-CCNC: 24 IU/L (ref 0–44)
AST SERPL-CCNC: 20 IU/L (ref 0–40)
BASOPHILS # BLD AUTO: 0.1 X10E3/UL (ref 0–0.2)
BASOPHILS NFR BLD AUTO: 1 %
BILIRUB DIRECT SERPL-MCNC: 0.1 MG/DL (ref 0–0.4)
BILIRUB SERPL-MCNC: 0.4 MG/DL (ref 0–1.2)
EOSINOPHIL # BLD AUTO: 0.2 X10E3/UL (ref 0–0.4)
EOSINOPHIL NFR BLD AUTO: 3 %
ERYTHROCYTE [DISTWIDTH] IN BLOOD BY AUTOMATED COUNT: 12.9 % (ref 11.6–15.4)
HCT VFR BLD AUTO: 43.4 % (ref 37.5–51)
HGB BLD-MCNC: 14.7 G/DL (ref 13–17.7)
IMM GRANULOCYTES # BLD: 0 X10E3/UL (ref 0–0.1)
IMM GRANULOCYTES NFR BLD: 0 %
LYMPHOCYTES # BLD AUTO: 1.7 X10E3/UL (ref 0.7–3.1)
LYMPHOCYTES NFR BLD AUTO: 23 %
MCH RBC QN AUTO: 30.8 PG (ref 26.6–33)
MCHC RBC AUTO-ENTMCNC: 33.9 G/DL (ref 31.5–35.7)
MCV RBC AUTO: 91 FL (ref 79–97)
MONOCYTES # BLD AUTO: 0.6 X10E3/UL (ref 0.1–0.9)
MONOCYTES NFR BLD AUTO: 8 %
NEUTROPHILS # BLD AUTO: 4.6 X10E3/UL (ref 1.4–7)
NEUTROPHILS NFR BLD AUTO: 65 %
PLATELET # BLD AUTO: 337 X10E3/UL (ref 150–450)
PROT SERPL-MCNC: 6.9 G/DL (ref 6–8.5)
RBC # BLD AUTO: 4.78 X10E6/UL (ref 4.14–5.8)
WBC # BLD AUTO: 7.2 X10E3/UL (ref 3.4–10.8)

## 2021-11-06 DIAGNOSIS — R51.9 HEADACHE: ICD-10-CM

## 2021-11-08 RX ORDER — MAGNESIUM OXIDE 400 MG/1
TABLET ORAL
Qty: 60 TABLET | Refills: 3 | Status: SHIPPED | OUTPATIENT
Start: 2021-11-08 | End: 2022-03-09

## 2021-12-01 ENCOUNTER — TELEPHONE (OUTPATIENT)
Dept: NEUROLOGY | Facility: CLINIC | Age: 41
End: 2021-12-01

## 2021-12-17 ENCOUNTER — OFFICE VISIT (OUTPATIENT)
Dept: NEUROLOGY | Facility: CLINIC | Age: 41
End: 2021-12-17
Payer: COMMERCIAL

## 2021-12-17 VITALS
DIASTOLIC BLOOD PRESSURE: 88 MMHG | TEMPERATURE: 97.2 F | HEART RATE: 71 BPM | HEIGHT: 68 IN | BODY MASS INDEX: 26.37 KG/M2 | SYSTOLIC BLOOD PRESSURE: 128 MMHG | WEIGHT: 174 LBS

## 2021-12-17 DIAGNOSIS — G35 MULTIPLE SCLEROSIS (HCC): ICD-10-CM

## 2021-12-17 DIAGNOSIS — G44.209 TENSION HEADACHE: ICD-10-CM

## 2021-12-17 DIAGNOSIS — E55.9 VITAMIN D DEFICIENCY: Primary | ICD-10-CM

## 2021-12-17 PROCEDURE — 99214 OFFICE O/P EST MOD 30 MIN: CPT | Performed by: PHYSICIAN ASSISTANT

## 2021-12-17 RX ORDER — GABAPENTIN 300 MG/1
CAPSULE ORAL
Qty: 120 CAPSULE | Refills: 5 | Status: SHIPPED | OUTPATIENT
Start: 2021-12-17 | End: 2022-06-20

## 2022-01-14 LAB
25(OH)D3+25(OH)D2 SERPL-MCNC: 33.9 NG/ML (ref 30–100)
CRP SERPL-MCNC: 2 MG/L (ref 0–10)
ENA SS-A AB SER-ACNC: <0.2 AI (ref 0–0.9)
ENA SS-B AB SER-ACNC: <0.2 AI (ref 0–0.9)
ERYTHROCYTE [SEDIMENTATION RATE] IN BLOOD BY WESTERGREN METHOD: 3 MM/HR (ref 0–15)

## 2022-01-15 LAB
ANA SPECKLED TITR SER: NORMAL {TITER}
ANA TITR SER IF: POSITIVE {TITER}
CENTROMERE B AB SER-ACNC: <0.2 AI (ref 0–0.9)
CHROMATIN AB SERPL-ACNC: <0.2 AI (ref 0–0.9)
DSDNA AB SER-ACNC: 3 IU/ML (ref 0–9)
ENA JO1 AB SER-ACNC: <0.2 AI (ref 0–0.9)
ENA RNP AB SER-ACNC: 0.3 AI (ref 0–0.9)
ENA SCL70 AB SER-ACNC: <0.2 AI (ref 0–0.9)
ENA SM AB SER-ACNC: <0.2 AI (ref 0–0.9)
ENA SM+RNP AB SER-ACNC: <0.2 AI (ref 0–0.9)
ENA SS-A AB SER-ACNC: <0.2 AI (ref 0–0.9)
ENA SS-B AB SER-ACNC: <0.2 AI (ref 0–0.9)
RHEUMATOID FACT SERPL-ACNC: <10 IU/ML
RIBOSOMAL P AB SER-ACNC: <0.2 AI (ref 0–0.9)
SL AMB NOTE:: NORMAL
SL AMB SEE BELOW:: NORMAL

## 2022-01-18 ENCOUNTER — TELEPHONE (OUTPATIENT)
Dept: NEUROLOGY | Facility: CLINIC | Age: 42
End: 2022-01-18

## 2022-01-18 DIAGNOSIS — M25.50 ARTHRALGIA, UNSPECIFIED JOINT: ICD-10-CM

## 2022-01-18 DIAGNOSIS — R76.8 POSITIVE ANA (ANTINUCLEAR ANTIBODY): Primary | ICD-10-CM

## 2022-01-18 NOTE — TELEPHONE ENCOUNTER
patient of Deedee Godinez PA-C, hx of MS last visit 12/17    patient is reporting pain/weakness in b/l LE/ joint, right knee worse than left  Said it's the same pain he reported in office visit  He said he was returning a call that his father took for him however I could not reference  Asked if bw could be reviewed, now resulted  Please review and provide input, thank you      best thalia [phone]

## 2022-01-18 NOTE — TELEPHONE ENCOUNTER
Patient aware of recommendation  [phone], I faxed same as requested

## 2022-01-18 NOTE — TELEPHONE ENCOUNTER
As discussed at his visit, joint pain is not associated with MS  I did some blood work looking for any other causes of his joint pain  He does have a positive PARVIN, which is non-specific, but because he also has joint pain, would suggest referral to rheumatology  In the meantime, would have him see his PCP regarding joint pain and see if they can start him on something for that      Also, his vit D looks good now, so would suggest making sure he is taking a daily vit D supplement of 4,000IU daily

## 2022-02-17 ENCOUNTER — HOSPITAL ENCOUNTER (OUTPATIENT)
Dept: MRI IMAGING | Facility: HOSPITAL | Age: 42
Discharge: HOME | End: 2022-02-17
Payer: COMMERCIAL

## 2022-02-17 DIAGNOSIS — G35 MULTIPLE SCLEROSIS (HCC): ICD-10-CM

## 2022-02-17 PROCEDURE — A9585 GADOBUTROL INJECTION: HCPCS | Performed by: PHYSICIAN ASSISTANT

## 2022-02-17 PROCEDURE — 70553 MRI BRAIN STEM W/O & W/DYE: CPT

## 2022-02-17 PROCEDURE — G1004 CDSM NDSC: HCPCS

## 2022-02-17 RX ADMIN — GADOBUTROL 7 ML: 604.72 INJECTION INTRAVENOUS at 19:35

## 2022-03-09 DIAGNOSIS — R51.9 HEADACHE: ICD-10-CM

## 2022-03-09 RX ORDER — MAGNESIUM OXIDE 400 MG/1
TABLET ORAL
Qty: 60 TABLET | Refills: 3 | Status: SHIPPED | OUTPATIENT
Start: 2022-03-09 | End: 2022-07-11

## 2022-04-08 ENCOUNTER — TELEPHONE (OUTPATIENT)
Dept: NEUROLOGY | Facility: CLINIC | Age: 42
End: 2022-04-08

## 2022-04-22 ENCOUNTER — OFFICE VISIT (OUTPATIENT)
Dept: NEUROLOGY | Facility: CLINIC | Age: 42
End: 2022-04-22
Payer: COMMERCIAL

## 2022-04-22 VITALS
RESPIRATION RATE: 18 BRPM | DIASTOLIC BLOOD PRESSURE: 86 MMHG | TEMPERATURE: 98.3 F | HEIGHT: 68 IN | HEART RATE: 73 BPM | BODY MASS INDEX: 26.73 KG/M2 | WEIGHT: 176.4 LBS | SYSTOLIC BLOOD PRESSURE: 134 MMHG

## 2022-04-22 DIAGNOSIS — K92.1 BLOOD IN STOOL: ICD-10-CM

## 2022-04-22 DIAGNOSIS — G35 MULTIPLE SCLEROSIS (HCC): Primary | ICD-10-CM

## 2022-04-22 PROCEDURE — 99214 OFFICE O/P EST MOD 30 MIN: CPT | Performed by: PHYSICIAN ASSISTANT

## 2022-04-22 NOTE — PATIENT INSTRUCTIONS
Continue dimethyl fumarate  Update labs again in May  Will check MRI c-spine  Follow up in 4 months or sooner if needed

## 2022-04-22 NOTE — ASSESSMENT & PLAN NOTE
Patient reports to me today that since yesterday he has been having some intermittent abdominal pain and blood in his stool  He does not have any fever and appears well currently  I advised that he call his PCP, however it is late in the day on a Friday and he is considering going to the ED for evaluation

## 2022-04-22 NOTE — PROGRESS NOTES
Patient ID: Shmuel Parker is a 35 y o  male  Assessment/Plan:    Multiple sclerosis ([facility] [address] )  Patient remains clinically stable, although he has a little bit of an increase in his normal left-sided symptoms  He feels like his left side does not match the right-sided just feels funny  His left side is typically his more affected side  He had an updated MRI brain in February 2022 which was stable compared to June 2021  He had started dimethyl fumarate in August 2021 and has been tolerating well  Suggested updating MRI C-spine due to increasing left-sided symptoms  His labs have overall been stable, most recently 03/25/2022 with absolute lymphocyte count of 1000  He will continue to get labs done every other month with standing order  His neurologic exam is stable today    Blood in stool  Patient reports to me today that since yesterday he has been having some intermittent abdominal pain and blood in his stool  He does not have any fever and appears well currently  I advised that he call his PCP, however it is late in the day on a Friday and he is considering going to the ED for evaluation  Patient will follow-up in 4 months or sooner if needed  He was advised to call the office for any new or worsening symptoms in the meantime  Diagnoses and all orders for this visit:    Multiple sclerosis ([facility] )  -     MRI cervical spine with and without contrast; Future    Blood in stool           Subjective:    HPI    Shmuel Parker is a 47-year-old male who presents today for follow up  He was last seen in December 2021  Patient had initially presented to [facility] on 8/23/20 with generalized weakness x 2 days  Prior to admission his left side appeared weaker than the right, he was falling to the left with ambulation, and had mild speech difficulties  CT head showed a white matter hypodensity in the left frontal region which could represent area of demyelination or prior infarct  No acute changes  CTA head and neck with no occlusive disease  Neurology was consulted  On exam he was found found to have left upper and lower extremity ataxia  MRI brain demonstrated multiple ovoid bilateral subcortical and periventricular T2 signal lesions oriented perpendicular to the ventricular system  Single similar T2 lesion oriented in the right anterior inferior lateral thalamus  Repeat study with contrast did not demonstrate any enhancement  MRI C-spine demonstrated T2 lesions compatible with a history of MS in the left lateral cord at C2-C3, mid cord at C3-C4 small right anterior lesion at C4  There is no abnormal enhancement  MRI T-spine demonstrated a healed T7 vertebral body fracture with hardware  There is no clear T2 signal abnormalities noted within the thoracic cord  There is no abnormal enhancement  LP was performed  Basic CSF studies were unremarkable including glucose, protein, rbc's, wbc's  MS panel was positive with >5 oligoclonal bands not present in the serum, elevated IgG synthesis rate, normal MBP  He completed 5 days of high-dose Solu-Medrol 1 g daily  He was told he met the criteria for MS diagnosis  At time of discharge, the plan was for him to follow with [facility]  He did not follow-up with [facility] due to insurance reasons  Patient then presented to [facility] on 12/05/2020 complaining of painful pressure and dysthesias in the right distal upper extremity exacerbated with use  Neurology was consulted  It was suspected that he had a peripheral nerve issue such as carpal tunnel syndrome  It was recommended that he obtain MRI brain outpatient (it was not available at the time he was admitted), and EMG of the right upper extremity  MRI brain with and without contrast was completed on 12/15/2020 and demonstrated a moderate degree of demyelinating disease with chronic appearing lesions    There was 1 enhancing subcentimeter lesion in the paramedian anterior left frontal lobe   EMG of the right upper extremity was completed on 12/11/2020 and demonstrated a mild carpal tunnel syndrome  Patient reported a family history of MS in his mother, who he has no contact with  He is not sure what her course has been like  Patient reported that over the years he has had multiple symptoms that could have been concerning for a neurologic disorder including sensory and motor dysfunction of different limbs at different times, some increased frequency and urgency of urination, some falls  He reported he had heavy drug use and probably ignored most of it or did not feel most of it to the extent he would have if he was not high  He has been clean and sober x 2 years  At timing of his hospital follow up visit we discussed disease modifying therapy  He was adamant about no injectable meds given prior drug use and having needles in the house would be a trigger for him  We discussed oral meds  He is JCV positive with index 1 48  He was most interested in [place]  Aubagio was approved, but patient then became reluctant to take the medication  He is specifically worried about the potential side effect of peripheral neuropathy, which is not a common side effect  He was informed that this is a rare side effect  He then said he did not want to take a pill every day and inquired about injectable meds although he initially did not want to take them  He was started on Copaxone  MRI brain was completed 06/02/2021 for surveillance and for a new baseline, and this demonstrated for new foci of demyelination, 2 of which demonstrated enhancement  He denied any new, focal symptoms but was given 3 days of IV steroids in mid June 2021  Tolerated the steroids well  Patient started Copaxone in early June 2021  He initially had some local injection site redness, otherwise did ok  On 7/3/21 he called the office over the weekend reporting a more severe reaction with his injection     He injected on 07/02 into the abdomen  Soon after he had swelling and redness at the injection site, which by the next day was "baseball sized", as well as he had hives over his arms abdomen and itching in his genital area  He took Benadryl the night after his injection and did notice a little bit of improvement the next day  He was advised to go to urgent care/ ER  He went to urgent care the next morning and was prescribed an oral prednisone taper  He was advised to discontinue his glatiramer acetate  At timing of 7/9/21 visit, decision was made to start dimethyl fumarate  He started dimethyl fumarate in early August 2021  Today, patient reports he is doing well, except he is having some GI issues  He has had some abdominal pain that comes and goes since yesterday and had some blood in his stool  He has not contacted PCP yet, thinking of going to ED since it is the weekend  Denies fever  MS-wise, he reports his left side feels a little funny  Left side is typically his more affected side from his MS  He just feels as if it doesnt match his right side, maybe that is dragging behind a little  No bladder incontinence  No weakness  No vision changes, vertigo, speech or swallowing changes  He is tolerating dimethyl fumarate well  Last labs completed 3/21/22 with normal LFTs, CBC with ALC 1 0  MRI brain was updated 2/17/22 and stable compared to 6/2/21, no evidence of disease progression  The following portions of the patient's history were reviewed and updated as appropriate: current medications, past family history, past medical history, past social history, past surgical history and problem list          Objective:    Blood pressure 134/86, pulse 73, temperature 98 3 °F (36 8 °C), temperature source Tympanic, resp  rate 18, height 5' 8" (1 727 m), weight 80 kg (176 lb 6 4 oz)  Physical Exam  Constitutional:       Appearance: Normal appearance     HENT:      Head: Normocephalic and atraumatic  Eyes:      Extraocular Movements: EOM normal       Pupils: Pupils are equal, round, and reactive to light  Neurological:      Mental Status: He is alert  Deep Tendon Reflexes: Reflexes are normal and symmetric  Psychiatric:         Mood and Affect: Mood normal          Speech: Speech normal          Behavior: Behavior normal          Neurological Exam  Mental Status  Alert  Oriented to person, place, time and situation  Speech is normal  Language is fluent with no aphasia  Attention and concentration are normal     Cranial Nerves  CN II: Visual fields full to confrontation  CN III, IV, VI: Extraocular movements intact bilaterally  Pupils equal round and reactive to light bilaterally  CN V: Facial sensation is normal   CN VII: Full and symmetric facial movement  CN VIII: Hearing is normal   CN IX, X: Palate elevates symmetrically  CN XI: Shoulder shrug strength is normal   CN XII: Tongue midline without atrophy or fasciculations  Motor   Normal muscle tone  Strength is 5/5 in all four extremities except as noted  Slight give-way weakness on the left initially, but able to maintain full strength  Sensory  Light touch is normal in upper and lower extremities  Reflexes  Deep tendon reflexes are 2+ and symmetric in all four extremities with downgoing toes bilaterally  Coordination  Right: Finger-to-nose normal   Left: Finger-to-nose normal     Gait  Slightly antalgic gait   ROS:    Review of Systems   Constitutional: Positive for appetite change (decreased) and fatigue  Negative for chills and fever  HENT: Positive for ear pain (left ear)  Negative for sore throat  Eyes: Negative for pain and visual disturbance  Respiratory: Negative for cough and shortness of breath  Cardiovascular: Negative for chest pain and palpitations  Gastrointestinal: Positive for abdominal pain and nausea  Negative for vomiting  Genitourinary: Negative for dysuria and hematuria  Pts wife stated blood in his stool 12 - 24 hours     Musculoskeletal: Positive for back pain (lower back)  Negative for arthralgias  Skin: Negative for color change and rash  Neurological: Positive for weakness (whole body, pt states left side feels longer and more weaker than from right side)  Negative for seizures and syncope  Pt states tingling in bilateral arms and hands and chest     Psychiatric/Behavioral: Positive for sleep disturbance (trouble falling asleep)  All other systems reviewed and are negative

## 2022-04-22 NOTE — ASSESSMENT & PLAN NOTE
Patient remains clinically stable, although he has a little bit of an increase in his normal left-sided symptoms  He feels like his left side does not match the right-sided just feels funny  His left side is typically his more affected side  He had an updated MRI brain in February 2022 which was stable compared to June 2021  He had started dimethyl fumarate in August 2021 and has been tolerating well  Suggested updating MRI C-spine due to increasing left-sided symptoms  His labs have overall been stable, most recently 03/25/2022 with absolute lymphocyte count of 1000  He will continue to get labs done every other month with standing order      His neurologic exam is stable today

## 2022-05-24 ENCOUNTER — HOSPITAL ENCOUNTER (OUTPATIENT)
Dept: MRI IMAGING | Facility: HOSPITAL | Age: 42
Discharge: HOME | End: 2022-05-24
Payer: COMMERCIAL

## 2022-05-24 DIAGNOSIS — G35 MULTIPLE SCLEROSIS (HCC): ICD-10-CM

## 2022-05-24 PROCEDURE — 72156 MRI NECK SPINE W/O & W/DYE: CPT

## 2022-05-24 PROCEDURE — A9585 GADOBUTROL INJECTION: HCPCS | Performed by: PHYSICIAN ASSISTANT

## 2022-05-24 PROCEDURE — G1004 CDSM NDSC: HCPCS

## 2022-05-24 RX ADMIN — GADOBUTROL 8 ML: 604.72 INJECTION INTRAVENOUS at 20:00

## 2022-06-07 ENCOUNTER — TELEPHONE (OUTPATIENT)
Dept: NEUROLOGY | Facility: CLINIC | Age: 42
End: 2022-06-07

## 2022-06-07 NOTE — TELEPHONE ENCOUNTER
----- Message from Hair Zambrano PA-C sent at 6/7/2022  8:48 AM EDT -----  Please let patient know MRI c-spine with no active lesions  There were no studies to compare to, so unknown if there have been additional lesions since his last c-spine imaging  His last MRI c-spine was done at [facility]  Can you ask patient to call [facility] and get a copy of the disc from his last MRI c-spine? He can then bring it to the office (drop it off, mail it) and we can upload it into our system and ask the radiologist to compare to the prior imaging    Thanks

## 2022-06-07 NOTE — TELEPHONE ENCOUNTER
Spoke w/patient  Advised him of results below  Patient verbalized understanding  He will obtain MRI disc from [facility] and drop off at [facility] as it is closer to where he lives  Requested patient call our office to let us know when he drops off disc  Patient verbalized agreement

## 2022-06-20 DIAGNOSIS — G35 MULTIPLE SCLEROSIS (HCC): ICD-10-CM

## 2022-06-20 RX ORDER — GABAPENTIN 300 MG/1
CAPSULE ORAL
Qty: 120 CAPSULE | Refills: 5 | Status: SHIPPED | OUTPATIENT
Start: 2022-06-20 | End: 2022-06-27 | Stop reason: SDUPTHER

## 2022-06-27 ENCOUNTER — TELEMEDICINE (OUTPATIENT)
Dept: RHEUMATOLOGY | Facility: CLINIC | Age: 42
End: 2022-06-27
Payer: COMMERCIAL

## 2022-06-27 DIAGNOSIS — M25.50 ARTHRALGIA, UNSPECIFIED JOINT: ICD-10-CM

## 2022-06-27 DIAGNOSIS — R20.2 PARESTHESIAS IN RIGHT HAND: ICD-10-CM

## 2022-06-27 DIAGNOSIS — G35 MULTIPLE SCLEROSIS (HCC): ICD-10-CM

## 2022-06-27 DIAGNOSIS — R76.8 POSITIVE ANA (ANTINUCLEAR ANTIBODY): Primary | ICD-10-CM

## 2022-06-27 DIAGNOSIS — G56.01 CARPAL TUNNEL SYNDROME OF RIGHT WRIST: ICD-10-CM

## 2022-06-27 PROCEDURE — 99244 OFF/OP CNSLTJ NEW/EST MOD 40: CPT | Performed by: INTERNAL MEDICINE

## 2022-06-27 RX ORDER — GABAPENTIN 300 MG/1
CAPSULE ORAL
Qty: 150 CAPSULE | Refills: 5 | Status: SHIPPED | OUTPATIENT
Start: 2022-06-27

## 2022-06-27 RX ORDER — DICLOFENAC SODIUM 75 MG/1
75 TABLET, DELAYED RELEASE ORAL 2 TIMES DAILY
Qty: 60 TABLET | Refills: 5 | Status: SHIPPED | OUTPATIENT
Start: 2022-06-27

## 2022-06-27 NOTE — PROGRESS NOTES
Virtual Regular Visit    Verification of patient location:    Patient is located in the following state in which I hold an active license [place]      Assessment/Plan:  42yo male presents as a Rheumatology referral for evaluation of possible connective tissue disease given positive PARVIN and joint pain  Already has history of multiple sclerosis that was diagnosed in 2020  On rheumatological ROS, patient denies photosensitivity, rashes, or oral/nasal ulcers  Eyes get dry and gets Raynaud's symptoms of toes  Though he does smoke 1 PPD of cigarettes  His knees and back of left heel bother him  Explained to patient that his low positive PARVIN of 1:80 in a speckled pattern has a high likelihood of being a false positive, since he already has an autoimmune disease, the MS; it does not necessarily mean he has a connective tissue disease in addition such as lupus or Sjogren's syndrome  Patient's arthralgia is nonspecific  Further autoimmune workup ordered below, including lupus activity labs and anti-CCP to evaluate for RA  Further management depending on workup results      Do labs as soon as possible  Get knee and ankle x-ray reports from [facility] faxed to us at [phone]  Can go up on gabapentin to 2 capsules in morning and 3 capsules at night, which he is on as part of his MS management - making Neurology aware in this note that night time gabapentin dose was increased to 900 mg  Can take diclofenac twice a day as needed for joint pain, take with food    Problem List Items Addressed This Visit        Nervous and Auditory    Multiple sclerosis (HCC)    Relevant Medications    gabapentin (NEURONTIN) 300 mg capsule    diclofenac (VOLTAREN) 75 mg EC tablet    Carpal tunnel syndrome of right wrist    Relevant Medications    gabapentin (NEURONTIN) 300 mg capsule       Other    Paresthesias in right hand    Relevant Medications    gabapentin (NEURONTIN) 300 mg capsule      Other Visit Diagnoses     Positive PARVIN (antinuclear antibody)    -  Primary    Relevant Orders    Urinalysis with microscopic    Sedimentation rate, automated    Protein / creatinine ratio, urine    C-reactive protein    Basic metabolic panel    C3 complement    C4 complement    Anti-DNA antibody, double-stranded    Cyclic citrul peptide antibody, IgG    Arthralgia, unspecified joint        Relevant Medications    diclofenac (VOLTAREN) 75 mg EC tablet    Other Relevant Orders    Cyclic citrul peptide antibody, IgG        Return to clinic on 11/3rd at 4pm        Reason for visit is evaluate for possible connective tissue disease given positive PARVIN  Chief Complaint   Patient presents with    Virtual Regular Visit        Encounter provider Elmer Ivory MD    Provider located at [address] [id number] [phone]      Recent Visits  No visits were found meeting these conditions  Showing recent visits within past 7 days and meeting all other requirements  Today's Visits  Date Type Provider Dept   06/27/22 Kenroy Archer MD [facility] [place]   Showing today's visits and meeting all other requirements  Future Appointments  No visits were found meeting these conditions  Showing future appointments within next 150 days and meeting all other requirements       The patient was identified by name and date of birth  [facility] was informed that this is a telemedicine visit and that the visit is being conducted through [facility] and patient was informed this is a secure, HIPAA-complaint platform  He agrees to proceed     My office door was closed  No one else was in the room  He acknowledged consent and understanding of privacy and security of the video platform  The patient has agreed to participate and understands they can discontinue the visit at any time  Patient is aware this is a billable service       Subjective/HPI  Öcsény Hailey is a 35 y o  male who presents for evaluation of positive PARVIN  Diagnosed with MS in 8/2020; [place] [facility] follows him for MS  Is on [employer], magnesium, vitamin C, and gabapentin  Has weakness of right leg, and pain of left leg  Denies photosensitivity, rashes, oral/nasal ulcers  Eyes get dry; is an automechanic  Toes change colors with the cold  Smokes 1 PPD of cigarettes  Right knee and behind knee cap hurts a lot; left knee aches  Back of left heel - gets a sharp pain when he gets out of bed at night  Past Medical History:   Diagnosis Date    MS (multiple sclerosis) ([facility] [address] )        Past Surgical History:   Procedure Laterality Date    BACK SURGERY      HAND SURGERY Right 2011       Current Outpatient Medications   Medication Sig Dispense Refill    diclofenac (VOLTAREN) 75 mg EC tablet Take 1 tablet (75 mg total) by mouth 2 (two) times a day 60 tablet 5    gabapentin (NEURONTIN) 300 mg capsule Take 2 capsules in the morning and 3 capsules at bedtime 150 capsule 5    Dimethyl Fumarate 240 MG CPDR Take 1 capsule by mouth twice a day  60 capsule 10    ergocalciferol (VITAMIN D2) 50,000 units Take 1 capsule (50,000 Units total) by mouth once a week 8 capsule 0    magnesium oxide (MAG-OX) 400 mg tablet TAKE 1 TABLET BY MOUTH TWICE A DAY 60 tablet 3    nicotine (NICODERM CQ) 14 mg/24hr TD 24 hr patch apply 1 patch TO CLEAN, DRY, AND INTACT SKIN REMOVE AND REPLACE once daily (Patient not taking: Reported on 8/20/2021)      nicotine (NICODERM CQ) 21 mg/24 hr TD 24 hr patch  (Patient not taking: Reported on 7/9/2021)      NON FORMULARY Medical Marijuana - daily prn       No current facility-administered medications for this visit  No Known Allergies    Review of Systems   Musculoskeletal: Positive for arthralgias  Negative for joint swelling  Skin: Positive for color change  Negative for rash  Neurological: Positive for weakness         Video Exam    There were no vitals filed for this visit     Physical Exam  Constitutional:       General: He is not in acute distress  HENT:      Head: Normocephalic and atraumatic  Eyes:      Conjunctiva/sclera: Conjunctivae normal    Pulmonary:      Effort: Pulmonary effort is normal  No respiratory distress  Musculoskeletal:      Cervical back: Neck supple  Skin:     Coloration: Skin is not pale  Neurological:      Mental Status: He is alert  Mental status is at baseline  Psychiatric:         Mood and Affect: Mood normal          Behavior: Behavior normal           I spent 30 minutes directly with the patient during this visit    VIRTUAL VISIT DISCLAIMER      Lou Wade verbally agrees to participate in [employer]  Pt is aware that [employer] could be limited without vital signs or the ability to perform a full hands-on physical exam  Elvis Hart understands he or the provider may request at any time to terminate the video visit and request the patient to seek care or treatment in person

## 2022-06-27 NOTE — PATIENT INSTRUCTIONS
Do labs as soon as possible  Get knee and ankle x-ray reports from [facility] faxed to us at [phone]  Can go up on gabapentin to 2 capsules in morning and 3 capsules at night  Can take diclofenac twice a day as needed for joint pain, take with food    Return to clinic Nov 3rd at 4pm

## 2022-07-10 DIAGNOSIS — R51.9 HEADACHE: ICD-10-CM

## 2022-07-11 RX ORDER — LANOLIN ALCOHOL/MO/W.PET/CERES
CREAM (GRAM) TOPICAL
Qty: 60 TABLET | Refills: 3 | Status: SHIPPED | OUTPATIENT
Start: 2022-07-11

## 2022-07-18 ENCOUNTER — TELEPHONE (OUTPATIENT)
Dept: NEUROLOGY | Facility: CLINIC | Age: 42
End: 2022-07-18

## 2022-07-18 NOTE — TELEPHONE ENCOUNTER
Patient called and said that he obtained his previous cervical MRI results from [facility]  Patient said he was originally planning on dropping them off at [facility] but decided to send them in the mail instead  Patient said he plans on mailing results to the office today

## 2022-07-26 ENCOUNTER — TELEPHONE (OUTPATIENT)
Dept: NEUROLOGY | Facility: CLINIC | Age: 42
End: 2022-07-26

## 2022-07-26 NOTE — TELEPHONE ENCOUNTER
Received communication from [facility] rep that PA for dimethyl fumarate has been approved and medication shipment will be expedited as patient is out of medication  Johnny Cm, message from Pharmacy,    PA approved, I entered new pA in PT account and had the [facility] who spoke with Pt confirmed    Pt requested Thursday delivery, shipping today    Sincerely,  July Hernandez

## 2022-07-26 NOTE — TELEPHONE ENCOUNTER
Cinthia Naranjo of [facility] [phone] due to urgency of PA/shipment of medication  He advised to call [facility] for [facility] [phone]  Called [facility]  Spoke w/Shawn  Provided info for verbal PA  TAT is 24 hrs or less  Awaiting determination  Patient made aware

## 2022-07-27 ENCOUNTER — TELEPHONE (OUTPATIENT)
Dept: NEUROLOGY | Facility: CLINIC | Age: 42
End: 2022-07-27

## 2022-07-27 NOTE — TELEPHONE ENCOUNTER
LM to inform pt imaging was uploaded into chart  Mailed CD back to pt's address on file  Left the cb # for any questions or concerns  Jeyson KELLY imaging scanned in

## 2022-07-27 NOTE — TELEPHONE ENCOUNTER
MRI disc and report received in the mail  Report scanned into chart  MRI disc given to MA to upload images into chart      Ko boyd

## 2022-08-02 NOTE — TELEPHONE ENCOUNTER
Patient left vm asking why he received MRI disc back  Called patient  Spoke w/wife Billlex Meredith  Advised her of note below

## 2022-08-08 ENCOUNTER — TELEPHONE (OUTPATIENT)
Dept: NEUROLOGY | Facility: CLINIC | Age: 42
End: 2022-08-08

## 2022-08-08 NOTE — TELEPHONE ENCOUNTER
Patient called back to reschedule the appt as he is on vacation  RESCHEDULED: Tues, 11/1/22 at 3:30pm in [place]

## 2022-08-08 NOTE — TELEPHONE ENCOUNTER
LMOM to remind pt of upcoming appt on 08/22/22  Left the office number for any questions or concerns

## 2022-10-18 ENCOUNTER — TELEPHONE (OUTPATIENT)
Dept: NEUROLOGY | Facility: CLINIC | Age: 42
End: 2022-10-18

## 2022-11-01 ENCOUNTER — OFFICE VISIT (OUTPATIENT)
Dept: NEUROLOGY | Facility: CLINIC | Age: 42
End: 2022-11-01

## 2022-11-01 VITALS
HEIGHT: 68 IN | WEIGHT: 177 LBS | BODY MASS INDEX: 26.83 KG/M2 | DIASTOLIC BLOOD PRESSURE: 68 MMHG | TEMPERATURE: 97.8 F | SYSTOLIC BLOOD PRESSURE: 123 MMHG | HEART RATE: 77 BPM

## 2022-11-01 DIAGNOSIS — G35 MULTIPLE SCLEROSIS (HCC): Primary | ICD-10-CM

## 2022-11-01 RX ORDER — MODAFINIL 100 MG/1
TABLET ORAL
Qty: 30 TABLET | Refills: 0 | Status: SHIPPED | OUTPATIENT
Start: 2022-11-01 | End: 2022-11-03 | Stop reason: SDUPTHER

## 2022-11-01 RX ORDER — PREGABALIN 75 MG/1
75 CAPSULE ORAL 2 TIMES DAILY
Qty: 60 CAPSULE | Refills: 2 | Status: SHIPPED | OUTPATIENT
Start: 2022-11-01 | End: 2022-11-03 | Stop reason: SDUPTHER

## 2022-11-01 NOTE — PROGRESS NOTES
Patient ID: Alphonso Bran is a 35 y o  male  Assessment/Plan:    Multiple sclerosis ([facility] )  Patient remains clinically stable, denies any new symptoms at this time  He continues on dimethyl fumarate tolerates the medication well overall  MRI brain Feb 2022 was stable    Labs 8/24/22 with , right around his baseline since starting dimethyl fumarate  He was advised to continue getting labs done every other month with standing order  He reports ongoing paresthesias and allodynia, nelson in the RUE  He is currently on gabapentin 600mg AM and 900mg PM   --will change from gabapentin to Lyrica  He will take gabapentin 300 mg AM and 600 mg PM x1 week, then 300 mg PM x1 week, then stop  --he will then initiate Lyrica 75 mg b i d     We can titrate if needed    He reports ongoing fatigue which develops later in the day around 2-3 p m  Arlucy Stacey --will start modafinil 100 mg in the early afternoon as needed for fatigue  He is also following with Rheumatology for joint pain and positive PARVIN, workup ongoing at this time  His neurologic exam is stable today  He will return in 4-6 months or sooner if needed  Will likely order updated MRI at that time  He reports       Diagnoses and all orders for this visit:    Multiple sclerosis ([facility] )  -     Ambulatory Referral to Physical Therapy; Future  -     CBC and differential; Standing  -     Hepatic function panel; Standing  -     CBC and differential  -     Hepatic function panel  -     pregabalin (LYRICA) 75 mg capsule; Take 1 capsule (75 mg total) by mouth 2 (two) times a day  -     modafinil (PROVIGIL) 100 mg tablet; Take 1 po daily as needed           Subjective:    HPI    Alphonso Bran is a 75-year-old male who presents today for follow up  He was last seen in April 2022  Patient had initially presented to [facility] on 8/23/20 with generalized weakness x 2 days   Prior to admission his left side appeared weaker than the right, he was falling to the left with ambulation, and had mild speech difficulties  CT head showed a white matter hypodensity in the left frontal region which could represent area of demyelination or prior infarct  No acute changes  CTA head and neck with no occlusive disease  Neurology was consulted  On exam he was found found to have left upper and lower extremity ataxia  MRI brain demonstrated multiple ovoid bilateral subcortical and periventricular T2 signal lesions oriented perpendicular to the ventricular system  Single similar T2 lesion oriented in the right anterior inferior lateral thalamus  Repeat study with contrast did not demonstrate any enhancement  MRI C-spine demonstrated T2 lesions compatible with a history of MS in the left lateral cord at C2-C3, mid cord at C3-C4 small right anterior lesion at C4  There is no abnormal enhancement  MRI T-spine demonstrated a healed T7 vertebral body fracture with hardware  There is no clear T2 signal abnormalities noted within the thoracic cord  There is no abnormal enhancement  LP was performed  Basic CSF studies were unremarkable including glucose, protein, rbc's, wbc's  MS panel was positive with >5 oligoclonal bands not present in the serum, elevated IgG synthesis rate, normal MBP  He completed 5 days of high-dose Solu-Medrol 1 g daily  He was told he met the criteria for MS diagnosis  At time of discharge, the plan was for him to follow with [facility]  He did not follow-up with [facility] due to insurance reasons  Patient then presented to [facility] on 12/05/2020 complaining of painful pressure and dysthesias in the right distal upper extremity exacerbated with use  Neurology was consulted  It was suspected that he had a peripheral nerve issue such as carpal tunnel syndrome  It was recommended that he obtain MRI brain outpatient (it was not available at the time he was admitted), and EMG of the right upper extremity       MRI brain with and without contrast was completed on 12/15/2020 and demonstrated a moderate degree of demyelinating disease with chronic appearing lesions  There was 1 enhancing subcentimeter lesion in the paramedian anterior left frontal lobe  EMG of the right upper extremity was completed on 12/11/2020 and demonstrated a mild carpal tunnel syndrome  Patient reported a family history of MS in his mother, who he has no contact with  He is not sure what her course has been like  Patient reported that over the years he has had multiple symptoms that could have been concerning for a neurologic disorder including sensory and motor dysfunction of different limbs at different times, some increased frequency and urgency of urination, some falls  He reported he had heavy drug use and probably ignored most of it or did not feel most of it to the extent he would have if he was not high  He no longer uses drugs  At timing of his hospital follow up visit we discussed disease modifying therapy  He was adamant about no injectable meds given prior drug use and having needles in the house would be a “trigger” for him  We discussed oral meds  He is JCV positive with index 1 48  He was most interested in [place]  Aubagio was approved, but patient then became reluctant to take the medication  He was specifically worried about the potential side effect of peripheral neuropathy  He then said he did not want to take a pill every day and inquired about injectable meds although he initially did not want to take them  He was started on Copaxone  MRI brain was completed 06/02/2021 for surveillance and for a new baseline, and this demonstrated for new foci of demyelination, 2 of which demonstrated enhancement  He denied any new, focal symptoms but was given 3 days of IV steroids in mid June 2021  Tolerated the steroids well  Patient started Copaxone in early June 2021  He initially had some local injection site redness, otherwise did ok    On 7/3/21 he called the office over the weekend reporting a more severe reaction with his injection  He injected on 07/02 into the abdomen  Soon after he had swelling and redness at the injection site, which by the next day was "baseball sized", as well as he had hives over his arms abdomen and itching in his genital area  He took Benadryl the night after his injection and did notice a little bit of improvement the next day  He was advised to go to urgent care/ ER  He went to urgent care the next morning and was prescribed an oral prednisone taper  He was advised to discontinue his glatiramer acetate  At timing of 7/9/21 visit, decision was made to start dimethyl fumarate  He started dimethyl fumarate in early August 2021  MRI brain was updated 2/17/22 and stable compared to 6/2/21, no evidence of disease progression  Today, patient reports he is doing well, however continues with allodynia and dysthesias in the RUE  He also has paresthesias in other parts of his body, but RUE is the worst   He is on gabapentin 600mg AM and 900mg PM   He was seen by rheumatology for positive PARVIN along with joint pain, further workup ordered, which he has not completed  He continues on dimethyl fumarate, tolerates well  Last labs completed 8/24/22, , normal LFTs  The following portions of the patient's history were reviewed and updated as appropriate: current medications, past family history, past medical history, past social history, past surgical history and problem list          Objective:    Blood pressure 123/68, pulse 77, temperature 97 8 °F (36 6 °C), height 5' 8" (1 727 m), weight 80 3 kg (177 lb)  Physical Exam  Constitutional:       Appearance: Normal appearance  HENT:      Head: Normocephalic and atraumatic  Eyes:      Extraocular Movements: EOM normal       Pupils: Pupils are equal, round, and reactive to light  Neurological:      Mental Status: He is alert        Deep Tendon Reflexes: Strength normal and reflexes are normal and symmetric  Psychiatric:         Mood and Affect: Mood normal          Speech: Speech normal          Behavior: Behavior normal          Neurological Exam  Mental Status  Alert  Oriented to person, place, time and situation  Speech is normal  Language is fluent with no aphasia  Attention and concentration are normal     Cranial Nerves  CN II: Visual fields full to confrontation  CN III, IV, VI: Extraocular movements intact bilaterally  Pupils equal round and reactive to light bilaterally  CN V: Facial sensation is normal   CN VII: Full and symmetric facial movement  CN VIII: Hearing is normal   CN IX, X: Palate elevates symmetrically  CN XI: Shoulder shrug strength is normal   CN XII: Tongue midline without atrophy or fasciculations  Motor   Normal muscle tone  Strength is 5/5 throughout all four extremities  Sensory  Light touch is normal in upper and lower extremities  Reflexes  Deep tendon reflexes are 2+ and symmetric in all four extremities  Coordination  Right: Finger-to-nose normal Left: Finger-to-nose normal     Gait    Antalgic, limping type gait--reports right knee and foot/ankle pain bothering him today  ROS:    Review of Systems   Constitutional: Positive for fatigue  Negative for appetite change and fever  HENT: Negative  Negative for hearing loss, tinnitus, trouble swallowing and voice change  Eyes: Negative  Negative for photophobia, pain and visual disturbance  Respiratory: Negative  Negative for shortness of breath  Cardiovascular: Negative  Negative for palpitations  Gastrointestinal: Negative  Negative for nausea and vomiting  Endocrine: Negative  Negative for cold intolerance  Genitourinary: Negative  Negative for dysuria, frequency and urgency  Musculoskeletal: Positive for gait problem  Negative for myalgias and neck pain  Skin: Negative  Negative for rash  Allergic/Immunologic: Negative  Neurological: Negative for dizziness, tremors, seizures, syncope, facial asymmetry, speech difficulty, weakness, light-headedness, numbness and headaches  Joints in hands, and skin feels tingling    Hematological: Negative  Does not bruise/bleed easily  Psychiatric/Behavioral: Negative  Negative for confusion, hallucinations and sleep disturbance  All other systems reviewed and are negative      I personally reviewed and updated the ROS as appropriate

## 2022-11-01 NOTE — ASSESSMENT & PLAN NOTE
Patient remains clinically stable, denies any new symptoms at this time  He continues on dimethyl fumarate tolerates the medication well overall  MRI brain Feb 2022 was stable    Labs 8/24/22 with , right around his baseline since starting dimethyl fumarate  He was advised to continue getting labs done every other month with standing order  He reports ongoing paresthesias and allodynia, nelson in the RUE  He is currently on gabapentin 600mg AM and 900mg PM   --will change from gabapentin to Lyrica  He will take gabapentin 300 mg AM and 600 mg PM x1 week, then 300 mg PM x1 week, then stop  --he will then initiate Lyrica 75 mg b i d     We can titrate if needed    He reports ongoing fatigue which develops later in the day around 2-3 p m  Jose Cortez --will start modafinil 100 mg in the early afternoon as needed for fatigue  He is also following with Rheumatology for joint pain and positive PARVIN, workup ongoing at this time  His neurologic exam is stable today  He will return in 4-6 months or sooner if needed  Will likely order updated MRI at that time    He reports

## 2022-11-01 NOTE — PATIENT INSTRUCTIONS
Continue dimethyl fumarate  Continue getting labs done every other month with standing order  Will change from gabapentin to Lyrica   -for one week, take gabapentin 300mg 1 in the AM and 2 in the PM  -for one week, take only 1 in the PM, then stop  -after finishing gabapentin, start Lyrica 75mg twice a day  Start modafinil 100mg take 1 in the early afternoon as needed for fatigue   Referral to PT      Get labs done for rheumatology appt    [facility] will be moving to a new office on September 5th  Our new office is located at [address] [place] [address] [address] [facility]      Follow up in 4-6 months or sooner if needed

## 2022-11-02 DIAGNOSIS — G35 MULTIPLE SCLEROSIS (HCC): ICD-10-CM

## 2022-11-02 NOTE — TELEPHONE ENCOUNTER
This is Alphonso Bran,    76  I saw  Leilani Mon  yesterday  The medication is not at [facility] and I see on the  paperwork, it says perform specialty and wants clarification       [phone]

## 2022-11-03 ENCOUNTER — OFFICE VISIT (OUTPATIENT)
Dept: RHEUMATOLOGY | Facility: CLINIC | Age: 42
End: 2022-11-03

## 2022-11-03 VITALS
DIASTOLIC BLOOD PRESSURE: 70 MMHG | WEIGHT: 176 LBS | BODY MASS INDEX: 26.67 KG/M2 | SYSTOLIC BLOOD PRESSURE: 118 MMHG | HEIGHT: 68 IN

## 2022-11-03 DIAGNOSIS — M25.362 KNEE INSTABILITY, LEFT: Primary | ICD-10-CM

## 2022-11-03 DIAGNOSIS — M77.11 LATERAL EPICONDYLITIS OF RIGHT ELBOW: ICD-10-CM

## 2022-11-03 LAB
ALBUMIN SERPL-MCNC: 4.6 G/DL (ref 4–5)
ALP SERPL-CCNC: 52 IU/L (ref 44–121)
ALT SERPL-CCNC: 49 IU/L (ref 0–44)
AST SERPL-CCNC: 36 IU/L (ref 0–40)
BASOPHILS # BLD AUTO: 0 X10E3/UL (ref 0–0.2)
BASOPHILS NFR BLD AUTO: 1 %
BILIRUB DIRECT SERPL-MCNC: <0.1 MG/DL (ref 0–0.4)
BILIRUB SERPL-MCNC: 0.3 MG/DL (ref 0–1.2)
EOSINOPHIL # BLD AUTO: 0.2 X10E3/UL (ref 0–0.4)
EOSINOPHIL NFR BLD AUTO: 4 %
ERYTHROCYTE [DISTWIDTH] IN BLOOD BY AUTOMATED COUNT: 13 % (ref 11.6–15.4)
HCT VFR BLD AUTO: 40.4 % (ref 37.5–51)
HGB BLD-MCNC: 13.7 G/DL (ref 13–17.7)
IMM GRANULOCYTES # BLD: 0 X10E3/UL (ref 0–0.1)
IMM GRANULOCYTES NFR BLD: 0 %
LYMPHOCYTES # BLD AUTO: 1 X10E3/UL (ref 0.7–3.1)
LYMPHOCYTES NFR BLD AUTO: 17 %
MCH RBC QN AUTO: 30.9 PG (ref 26.6–33)
MCHC RBC AUTO-ENTMCNC: 33.9 G/DL (ref 31.5–35.7)
MCV RBC AUTO: 91 FL (ref 79–97)
MONOCYTES # BLD AUTO: 0.5 X10E3/UL (ref 0.1–0.9)
MONOCYTES NFR BLD AUTO: 9 %
NEUTROPHILS # BLD AUTO: 4 X10E3/UL (ref 1.4–7)
NEUTROPHILS NFR BLD AUTO: 69 %
PLATELET # BLD AUTO: 326 X10E3/UL (ref 150–450)
PROT SERPL-MCNC: 6.9 G/DL (ref 6–8.5)
RBC # BLD AUTO: 4.43 X10E6/UL (ref 4.14–5.8)
WBC # BLD AUTO: 5.8 X10E3/UL (ref 3.4–10.8)

## 2022-11-03 RX ORDER — PREGABALIN 75 MG/1
75 CAPSULE ORAL 2 TIMES DAILY
Qty: 60 CAPSULE | Refills: 2 | Status: SHIPPED | OUTPATIENT
Start: 2022-11-03

## 2022-11-03 RX ORDER — MODAFINIL 100 MG/1
TABLET ORAL
Qty: 30 TABLET | Refills: 0 | Status: SHIPPED | OUTPATIENT
Start: 2022-11-03

## 2022-11-03 NOTE — PROGRESS NOTES
Assessment and Plan:   Daisy Linn is a 35 y o   male who presents for follow up of possible connective tissue disease given positive PARVIN, but this is likely to be a false positive given patient's history of MS  Gabapentin hasn't been helping his pain; may benefit from switching to Lyrica  Has had right lateral epicondylitis symptoms since yesterday, likely secondary to his job as a   Feels his left knee is unstable  Switch to Lyrica from gabapentin for pain  Do physical therapy  Hand therapy referral made for right tennis elbow  Orthopedics referral made for further management of his right knee instability; x-rays were unremarkable  Can take diclofenac twice a day as needed for joint pain, take with food; contact office if need refills    Return to clinic as needed    Plan:  Diagnoses and all orders for this visit:    Knee instability, left  -     Ambulatory referral to Orthopedic Surgery; Future    Lateral epicondylitis of right elbow  -     Ambulatory referral to PT/OT hand therapy; Future  Follow-up plan: RTC as needed        Rheumatic Disease Summary  Initial visit via telemedicine 6/27/22: 42yo male presents as a Rheumatology referral for evaluation of possible connective tissue disease given positive PARVIN and joint pain  Already has history of multiple sclerosis that was diagnosed in 2020  On rheumatological ROS, patient denies photosensitivity, rashes, or oral/nasal ulcers  Eyes get dry and gets Raynaud's symptoms of toes  Though he does smoke 1 PPD of cigarettes  His knees and back of left heel bother him  Explained to patient that his low positive PARVIN of 1:80 in a speckled pattern has a high likelihood of being a false positive, since he already has an autoimmune disease, the MS; it does not necessarily mean he has a connective tissue disease in addition such as lupus or Sjogren's syndrome  Patient's arthralgia is nonspecific   Further autoimmune workup ordered below, including lupus activity labs and anti-CCP to evaluate for RA  Further management depending on workup results  Do labs as soon as possible  Get knee and ankle x-ray reports from [facility] faxed to us at [phone]  Can go up on gabapentin to 2 capsules in morning and 3 capsules at night, which he is on as part of his MS management - making Neurology aware in this note that night time gabapentin dose was increased to 900 mg  Can take diclofenac twice a day as needed for joint pain, take with food      HPI  Humphrey Cai is a 35 y o   male who presents for follow up  Last appointment was 6/27/22 via telemedicine, this was his initial visit  Complains his left knee bothers him, and has had right tennis elbow since yesterday  Smokes <1/2 PPD of cigarettes  The following portions of the patient's history were reviewed and updated as appropriate: allergies, current medications, past family history, past medical history, past social history, past surgical history and problem list     Review of Systems:   Review of Systems   Constitutional: Positive for fatigue  HENT: Negative for mouth sores  Eyes: Negative for pain  Respiratory: Negative for shortness of breath  Cardiovascular: Negative for leg swelling  Endocrine: Positive for polydipsia  Musculoskeletal: Positive for arthralgias and back pain  Negative for joint swelling  Skin: Negative for rash  Neurological: Positive for weakness and numbness  Hematological: Negative for adenopathy  Psychiatric/Behavioral: Negative for sleep disturbance  Reviewed and agree      Home Medications:    Current Outpatient Medications:   •  diclofenac (VOLTAREN) 75 mg EC tablet, Take 1 tablet (75 mg total) by mouth 2 (two) times a day, Disp: 60 tablet, Rfl: 5  •  Dimethyl Fumarate 240 MG CPDR, Take 1 capsule by mouth twice a day , Disp: 60 capsule, Rfl: 10  •  ergocalciferol (VITAMIN D2) 50,000 units, Take 1 capsule (50,000 Units total) by mouth once a week, Disp: 8 capsule, Rfl: 0  •  magnesium Oxide (MAG-OX) 400 mg TABS, TAKE 1 TABLET BY MOUTH TWICE A DAY, Disp: 60 tablet, Rfl: 3  •  modafinil (PROVIGIL) 100 mg tablet, Take 1 po daily as needed, Disp: 30 tablet, Rfl: 0  •  nicotine (NICODERM CQ) 14 mg/24hr TD 24 hr patch, apply 1 patch TO CLEAN, DRY, AND INTACT SKIN REMOVE AND REPLACE once daily, Disp: , Rfl:   •  nicotine (NICODERM CQ) 21 mg/24 hr TD 24 hr patch, , Disp: , Rfl:   •  NON FORMULARY, Medical Marijuana - daily prn, Disp: , Rfl:   •  pregabalin (LYRICA) 75 mg capsule, Take 1 capsule (75 mg total) by mouth 2 (two) times a day, Disp: 60 capsule, Rfl: 2    Objective:    Vitals:    11/03/22 1559   BP: 118/70   Weight: 79 8 kg (176 lb)   Height: 5' 8" (1 727 m)       Physical Exam  Constitutional:       General: He is not in acute distress  HENT:      Head: Normocephalic and atraumatic  Eyes:      Conjunctiva/sclera: Conjunctivae normal    Cardiovascular:      Rate and Rhythm: Normal rate and regular rhythm  Heart sounds: S1 normal and S2 normal      No friction rub  Pulmonary:      Effort: Pulmonary effort is normal  No respiratory distress  Breath sounds: Normal breath sounds  No wheezing, rhonchi or rales  Musculoskeletal:         General: Tenderness present  Cervical back: Neck supple  Comments: Right lateral epicondyle tenderness   Skin:     Coloration: Skin is not pale  Neurological:      Mental Status: He is alert  Mental status is at baseline  Psychiatric:         Mood and Affect: Mood normal          Behavior: Behavior normal        Reviewed labs and imaging  Imaging:   MRI cervical spine 5/24/22  Scattered foci of demyelinating disease within the cervical cord, as described above  If any prior imaging is made available for review a direct comparison addendum will be issued  No abnormal enhancement identified to suggest the presence of active demyelination        Labs:   Office Visit on 11/01/2022   Component Date Value Ref Range Status   • White Blood Cell Count 11/02/2022 5 8  3 4 - 10 8 x10E3/uL Final   • Red Blood Cell Count 11/02/2022 4 43  4 14 - 5 80 x10E6/uL Final   • Hemoglobin 11/02/2022 13 7  13 0 - 17 7 g/dL Final   • HCT 11/02/2022 40 4  37 5 - 51 0 % Final   • MCV 11/02/2022 91  79 - 97 fL Final   • MCH 11/02/2022 30 9  26 6 - 33 0 pg Final   • MCHC 11/02/2022 33 9  31 5 - 35 7 g/dL Final   • RDW 11/02/2022 13 0  11 6 - 15 4 % Final   • Platelet Count 45/14/5675 326  150 - 450 x10E3/uL Final   • Neutrophils 11/02/2022 69  Not Estab  % Final   • Lymphocytes 11/02/2022 17  Not Estab  % Final   • Monocytes 11/02/2022 9  Not Estab  % Final   • Eosinophils 11/02/2022 4  Not Estab  % Final   • Basophils PCT 11/02/2022 1  Not Estab  % Final   • Neutrophils (Absolute) 11/02/2022 4 0  1 4 - 7 0 x10E3/uL Final   • Lymphocytes (Absolute) 11/02/2022 1 0  0 7 - 3 1 x10E3/uL Final   • Monocytes (Absolute) 11/02/2022 0 5  0 1 - 0 9 x10E3/uL Final   • Eosinophils (Absolute) 11/02/2022 0 2  0 0 - 0 4 x10E3/uL Final   • Basophils ABS 11/02/2022 0 0  0 0 - 0 2 x10E3/uL Final   • Immature Granulocytes 11/02/2022 0  Not Estab  % Final   • Immature Granulocytes (Absolute) 11/02/2022 0 0  0 0 - 0 1 x10E3/uL Final   • Protein, Total 11/02/2022 6 9  6 0 - 8 5 g/dL Final   • Albumin 11/02/2022 4 6  4 0 - 5 0 g/dL Final   • TOTAL BILIRUBIN 11/02/2022 0 3  0 0 - 1 2 mg/dL Final   • Bilirubin, Direct 11/02/2022 <0 10  0 00 - 0 40 mg/dL Final   • Alk Phos Isoenzymes 11/02/2022 52  44 - 121 IU/L Final   • AST 11/02/2022 36  0 - 40 IU/L Final   • ALT 11/02/2022 49 (A) 0 - 44 IU/L Final   Ancillary Orders on 05/27/2022   Component Date Value Ref Range Status   • White Blood Cell Count 07/13/2022 5 2  3 4 - 10 8 x10E3/uL Final   • Red Blood Cell Count 07/13/2022 4 60  4 14 - 5 80 x10E6/uL Final   • Hemoglobin 07/13/2022 14 2  13 0 - 17 7 g/dL Final   • HCT 07/13/2022 42 2  37 5 - 51 0 % Final   • MCV 07/13/2022 92  79 - 97 fL Final   • MCH 07/13/2022 30 9  26 6 - 33 0 pg Final   • MCHC 07/13/2022 33 6  31 5 - 35 7 g/dL Final   • RDW 07/13/2022 12 9  11 6 - 15 4 % Final   • Platelet Count 92/09/9691 365  150 - 450 x10E3/uL Final   • Neutrophils 07/13/2022 64  Not Estab  % Final   • Lymphocytes 07/13/2022 21  Not Estab  % Final   • Monocytes 07/13/2022 10  Not Estab  % Final   • Eosinophils 07/13/2022 3  Not Estab  % Final   • Basophils PCT 07/13/2022 1  Not Estab  % Final   • Neutrophils (Absolute) 07/13/2022 3 4  1 4 - 7 0 x10E3/uL Final   • Lymphocytes (Absolute) 07/13/2022 1 1  0 7 - 3 1 x10E3/uL Final   • Monocytes (Absolute) 07/13/2022 0 5  0 1 - 0 9 x10E3/uL Final   • Eosinophils (Absolute) 07/13/2022 0 2  0 0 - 0 4 x10E3/uL Final   • Basophils ABS 07/13/2022 0 0  0 0 - 0 2 x10E3/uL Final   • Immature Granulocytes 07/13/2022 1  Not Estab  % Final   • Immature Granulocytes (Absolute) 07/13/2022 0 0  0 0 - 0 1 x10E3/uL Final   • Protein, Total 07/13/2022 6 7  6 0 - 8 5 g/dL Final   • Albumin 07/13/2022 4 5  4 0 - 5 0 g/dL Final   • TOTAL BILIRUBIN 07/13/2022 0 3  0 0 - 1 2 mg/dL Final   • Bilirubin, Direct 07/13/2022 <0 10  0 00 - 0 40 mg/dL Final   • Alk Phos Isoenzymes 07/13/2022 53  44 - 121 IU/L Final   • AST 07/13/2022 27  0 - 40 IU/L Final   • ALT 07/13/2022 29  0 - 44 IU/L Final

## 2022-11-03 NOTE — PATIENT INSTRUCTIONS
Switch to Lyrica from gabapentin for pain  Do physical therapy  Hand therapy referral made for right tennis elbow  Orthopedics referral made  Can take diclofenac twice a day as needed for joint pain, take with food; call us if need refills    Return to clinic as needed

## 2022-11-03 NOTE — TELEPHONE ENCOUNTER
Pt left vm stating that he saw patrick on Tuesday and switched medication and not at the pharm  Paper work says to  at [facility] and would like clarification on this   Asking for a call back at [phone]    Called pt and left vm asking him to call back and confirm what pharm he would medication sent to

## 2022-11-09 DIAGNOSIS — R51.9 HEADACHE: ICD-10-CM

## 2022-11-09 DIAGNOSIS — G35 MULTIPLE SCLEROSIS (HCC): ICD-10-CM

## 2022-11-09 RX ORDER — LANOLIN ALCOHOL/MO/W.PET/CERES
CREAM (GRAM) TOPICAL
Qty: 60 TABLET | Refills: 3 | Status: SHIPPED | OUTPATIENT
Start: 2022-11-09

## 2022-11-09 NOTE — TELEPHONE ENCOUNTER
pt left vm stating that patrick prescribed him 2 different meds last week and they are still not at his pharm and they say that we have to approve it  asking for a call back to clarify all this     [phone]

## 2022-11-11 NOTE — TELEPHONE ENCOUNTER
Per last office note, pregabalin and modafinil were prescribed  Per chart review, both prescriptions were sent to [facility]  Pt stated below medications need approval  PA needed  Located fax stating modafinil needs IZA WEINSTEIN Key: [id number]  Submitted  No case started for pregabalin  Initiated PA on [facility], [address] [facility]  Submitted  Awaiting determination

## 2022-11-14 RX ORDER — PREGABALIN 75 MG/1
75 CAPSULE ORAL 2 TIMES DAILY
Qty: 60 CAPSULE | Refills: 2 | Status: CANCELLED | OUTPATIENT
Start: 2022-11-14

## 2022-11-14 NOTE — TELEPHONE ENCOUNTER
Yumiko Joy  to Karine Rodriguez PA-C          11/14/22 12:05 PM  The [facility] in [facility] on [address] said that the provigil was not approved by insurance, and the Lyrica was never sent to them  What am I supposed yo be doing about this?

## 2022-11-14 NOTE — TELEPHONE ENCOUNTER
Modafinil has been approved from 11/12/22 to 11/12/23  Per pt, pharmacy is claiming they did not receive pregabalin Rx, not that PA is needed  Called [facility]  They report modafinil has been ready to  since 11/3/22  States there is no copay  Unsure how this is as PA was just approved  Will notify pt  [facility] confirmed receipt of [facility] however they cannot see supervising physician information on script  Cannot fill without this  Asking for new script to be sent with this information  Dr HIRSCH- there have been issues with the new requirements for controlled substances sent by APs  Please review and sign if agreeable

## 2022-11-17 DIAGNOSIS — G35 MULTIPLE SCLEROSIS (HCC): ICD-10-CM

## 2022-11-17 RX ORDER — PREGABALIN 75 MG/1
75 CAPSULE ORAL 2 TIMES DAILY
Qty: 60 CAPSULE | Refills: 2 | Status: SHIPPED | OUTPATIENT
Start: 2022-11-17

## 2022-11-29 ENCOUNTER — OFFICE VISIT (OUTPATIENT)
Dept: OBGYN CLINIC | Facility: CLINIC | Age: 42
End: 2022-11-29

## 2022-11-29 ENCOUNTER — APPOINTMENT (OUTPATIENT)
Dept: RADIOLOGY | Facility: CLINIC | Age: 42
End: 2022-11-29

## 2022-11-29 VITALS
WEIGHT: 177.8 LBS | DIASTOLIC BLOOD PRESSURE: 88 MMHG | SYSTOLIC BLOOD PRESSURE: 131 MMHG | HEART RATE: 80 BPM | BODY MASS INDEX: 26.95 KG/M2 | HEIGHT: 68 IN

## 2022-11-29 DIAGNOSIS — M23.92 ACUTE INTERNAL DERANGEMENT OF LEFT KNEE: Primary | ICD-10-CM

## 2022-11-29 DIAGNOSIS — M25.362 KNEE INSTABILITY, LEFT: ICD-10-CM

## 2022-11-29 NOTE — PROGRESS NOTES
Ortho Sports Medicine Knee New Patient Visit     Assesment:   35 y o  male left knee suspected medial meniscal tear  Plan:    The patient's diagnosis and treatment were discussed at length today  We discussed no treatment, non-operative treatment, and operative treatment  Plan for left knee MRI to evaluate for medial meniscus tear  I also discussed possibility of corticosteroid injection as well his physical therapy for strengthening, however the patient complains of ongoing mechanical symptoms such as catching locking and severe pain affecting his ability to work as a   He will follow up after MRI for additional treatment planning  In the meantime advised that he continue to use pain as a guide for his activities  I also advised that he avoid twisting and turning activities of these most likely to result in mechanical instability  Conservative treatment:    Elvis's presentation and physical exam is suspicious for medial meniscal tear  He endorses catching and locking symptoms  On physical exam he has medial joint line tenderness  He has a positive medial Quinn test   Noted a investigate further we have ordered an MRI of the left knee  The MRI will allow better visualization of the internal components of the left knee  The patient works as a  and needs to be able to feel stable when working daily  Advised patient that he can use NSAIDs and Tylenol as needed for pain management  We did discuss steroid injection but would rather not do steroid injection prior to MRI as we would not want to alter the visualization on MRI  He can follow-up with us when the MRI results are in  Imaging: All imaging from today was reviewed by myself and explained to the patient  Injection:    No Injection planned at this time  Surgery:     No surgery is recommended at this point, continue with conservative treatment plan as noted        Follow up:    No follow-ups on file         Chief Complaint   Patient presents with   • Left Knee - Pain       History of Present Illness: The patient is a 35 y o  male whose occupation is , referred to me by his rheumatologist, seen in clinic for consultation of left knee pain  The pain has been present for several months  He does not recall a single injury  He has not tried physical therapy or steroid injection for the pain  He works as a  and the knee pain causes discomfort working  The pain also causes discomfort with performing normal ADLs  Denies numbness and tingling of left lower extremity  He endorses catching and locking symptoms and going from a seated to standing position  Notes instability when going up and down stairs  Knee Surgical History:  None    Past Medical, Social and Family History:  Past Medical History:   Diagnosis Date   • MS (multiple sclerosis) ([address] )      Past Surgical History:   Procedure Laterality Date   • BACK SURGERY     • HAND SURGERY Right 2011     No Known Allergies  Current Outpatient Medications on File Prior to Visit   Medication Sig Dispense Refill   • ergocalciferol (VITAMIN D2) 50,000 units Take 1 capsule (50,000 Units total) by mouth once a week 8 capsule 0   • magnesium Oxide (MAG-OX) 400 mg TABS TAKE 1 TABLET BY MOUTH TWICE A DAY 60 tablet 3   • NON FORMULARY Medical Marijuana - daily prn     • diclofenac (VOLTAREN) 75 mg EC tablet Take 1 tablet (75 mg total) by mouth 2 (two) times a day 60 tablet 5   • Dimethyl Fumarate 240 MG CPDR Take 1 capsule by mouth twice a day   60 capsule 10   • modafinil (PROVIGIL) 100 mg tablet Take 1 po daily as needed 30 tablet 0   • nicotine (NICODERM CQ) 14 mg/24hr TD 24 hr patch apply 1 patch TO CLEAN, DRY, AND INTACT SKIN REMOVE AND REPLACE once daily     • nicotine (NICODERM CQ) 21 mg/24 hr TD 24 hr patch      • pregabalin (LYRICA) 75 mg capsule Take 1 capsule (75 mg total) by mouth 2 (two) times a day 60 capsule 2     No current facility-administered medications on file prior to visit  Social History     Socioeconomic History   • Marital status: /Civil Union     Spouse name: Not on file   • Number of children: Not on file   • Years of education: Not on file   • Highest education level: Not on file   Occupational History   • Not on file   Tobacco Use   • Smoking status: Some Days     Packs/day: 0 50     Types: Cigarettes   • Smokeless tobacco: Never   Vaping Use   • Vaping Use: Never used   Substance and Sexual Activity   • Alcohol use: Not Currently   • Drug use: Not Currently     Types: Marijuana     Comment: medical marijuana   • Sexual activity: Yes     Partners: Female   Other Topics Concern   • Not on file   Social History Narrative   • Not on file     Social Determinants of Health     Financial Resource Strain: Not on file   Food Insecurity: Not on file   Transportation Needs: Not on file   Physical Activity: Not on file   Stress: Not on file   Social Connections: Not on file   Intimate Partner Violence: Not on file   Housing Stability: Not on file         I have reviewed the past medical, surgical, social and family history, medications and allergies as documented in the EMR  Review of systems: ROS is negative other than that noted in the HPI  Constitutional: Negative for fatigue and fever  HENT: Negative for sore throat  Respiratory: Negative for shortness of breath  Cardiovascular: Negative for chest pain  Gastrointestinal: Negative for abdominal pain  Endocrine: Negative for cold intolerance and heat intolerance  Genitourinary: Negative for flank pain  Musculoskeletal: Negative for back pain  Skin: Negative for rash  Allergic/Immunologic: Negative for immunocompromised state  Neurological: Negative for dizziness  Psychiatric/Behavioral: Negative for agitation        Physical Exam:    Blood pressure 131/88, pulse 80, height 5' 8" (1 727 m), weight 80 6 kg (177 lb 12 8 oz)     General/Constitutional: NAD, well developed, well nourished  HENT: Normocephalic, atraumatic  CV: Intact distal pulses, regular rate  Resp: No respiratory distress or labored breathing  Lymphatic: No lymphadenopathy palpated  Neuro: Alert and Oriented x 3, no focal deficits  Psych: Normal mood, normal affect, normal judgement, normal behavior  Skin: Warm, dry, no rashes, no erythema      Knee Exam (focused):  Visual inspection of the left knee demonstrates normal contour without atrophy  There is no significant erythema or edema  Moderate joint effusion   Range of motion is from 0-110 degrees of flexion   Able to straight leg raise   Positive medial joint line tenderness, negative lateral joint line tenderness  Positive medial Quinn's, negative lateral Quinn's  Negative Lachman exam, negative posterior drawer  Stable to varus and valgus stress at both 0 and 30°  Patella tracks normally  No J sign  No apprehension  Translation is approximately 2 quadrants and is equal to the contralateral side  Patellar eversion is similar to the contralateral side    Examination of the patient's ipsilateral hip demonstrates full painless range of motion  No crepitus  LE NV Exam: +2 DP/PT pulses bilaterally  Sensation intact to light touch L2-S1 bilaterally     Bilateral hip ROM demonstrates no pain actively or passively    No calf tenderness to palpation bilaterally    Knee Imaging    X-rays of the left knee were reviewed, which demonstrate mild medial joint space narrowing  No acute fractures  I have reviewed the radiology report and do not currently have a radiology reading from [facility], but will check the result once the reading is performed          Scribe Attestation    I,:   am acting as a scribe while in the presence of the attending physician :       I,:   personally performed the services described in this documentation    as scribed in my presence :

## 2022-12-27 ENCOUNTER — HOSPITAL ENCOUNTER (OUTPATIENT)
Dept: MRI IMAGING | Facility: HOSPITAL | Age: 42
Discharge: HOME | End: 2022-12-27

## 2022-12-27 DIAGNOSIS — M25.362 KNEE INSTABILITY, LEFT: ICD-10-CM

## 2022-12-27 DIAGNOSIS — M23.92 ACUTE INTERNAL DERANGEMENT OF LEFT KNEE: ICD-10-CM

## 2023-01-03 ENCOUNTER — OFFICE VISIT (OUTPATIENT)
Dept: OBGYN CLINIC | Facility: CLINIC | Age: 43
End: 2023-01-03

## 2023-01-03 VITALS
HEIGHT: 68 IN | DIASTOLIC BLOOD PRESSURE: 80 MMHG | BODY MASS INDEX: 26.52 KG/M2 | SYSTOLIC BLOOD PRESSURE: 119 MMHG | WEIGHT: 175 LBS

## 2023-01-03 DIAGNOSIS — M17.12 OSTEOARTHRITIS OF LEFT PATELLOFEMORAL JOINT: Primary | ICD-10-CM

## 2023-01-03 RX ORDER — BUPIVACAINE HYDROCHLORIDE 2.5 MG/ML
4 INJECTION, SOLUTION INFILTRATION; PERINEURAL
Status: COMPLETED | OUTPATIENT
Start: 2023-01-03 | End: 2023-01-03

## 2023-01-03 RX ORDER — TRIAMCINOLONE ACETONIDE 40 MG/ML
40 INJECTION, SUSPENSION INTRA-ARTICULAR; INTRAMUSCULAR
Status: COMPLETED | OUTPATIENT
Start: 2023-01-03 | End: 2023-01-03

## 2023-01-03 RX ADMIN — BUPIVACAINE HYDROCHLORIDE 4 ML: 2.5 INJECTION, SOLUTION INFILTRATION; PERINEURAL at 17:37

## 2023-01-03 RX ADMIN — TRIAMCINOLONE ACETONIDE 40 MG: 40 INJECTION, SUSPENSION INTRA-ARTICULAR; INTRAMUSCULAR at 17:37

## 2023-01-03 NOTE — PROGRESS NOTES
Ortho Sports Medicine Knee Follow Up Visit     Assesment:     38 y o  male left knee patellofemoral osteoarthritis and chronic MCL sprain    Plan:    The patient's diagnosis and treatment were discussed at length today  We discussed no treatment, non-operative treatment, and operative treatment  Explained to Abbie Callaway that his MRI is relatively reassuring regarding no meniscal tears or other significant intra-articular derangement  His MRI does demonstrate some patellofemoral chondromalacia as well as a chronic MCL sprain  I feel his symptoms are primarily related to his patellofemoral complaints at this time as he describes pain ascending descending stairs as well as standing from a seated position  In addition he has some discomfort kneeling at the site of his prior Osgood-Schlatter's disease along the anterior tibial tubercle  I performed a corticosteroid injection today and provided physical therapy referral for emphasis on range of motion strengthening  In addition we discussed the use of a short hinged knee brace, however the patient would like to hold off at this time  He can follow-up as needed  Conservative treatment:    Elvis's left knee presentation, physical examination, and imaging suggest that his knee pain is due to patella cartilage thinning and chronic MCL sprain  His MRI demonstrated no ACL, PCL, meniscal tear  There was evidence of chronic MCL sprain on MRI  MRI also showed patellar cartilage thinning  At this time we will proceed with conservative treatment of his knee pain  Today we performed a steroid injection of his left knee  We also provided him with a referral to physical therapy where he can work on quad strengthening for improved patellar tracking  He can follow-up with us in 6 to 8 weeks for evaluation of conservative treatment    If he feels that his pain is resolved in 6 to 8 weeks conservative treatment he can cancel his appointment and follow-up as needed  Imaging: All imaging from today was reviewed by myself and explained to the patient  Injection:    The risks and benefits of the injection (which include but are not limited to: infection, bleeding,damage to nerve/artery, need for further intervention), as well as the risks and benefits of all alternative treatments were explained and understood  The patient elected to proceed with injection  The procedure was done with aseptic technique, and the patient tolerated the procedure well with no complications  A corticosteroid injection was performed  Surgery:     No surgery is recommended at this point, continue with conservative treatment plan as noted  Follow up:    No follow-ups on file  Chief Complaint   Patient presents with   • Left Knee - Follow-up       History of Present Illness: The patient is returns for follow up of left knee pain suspected to be a medial meniscal tear  Since the prior visit, He reports minimal improvement  He is still experiencing pain on the medial and lateral aspect of patella  He reports no new injury since her last visit  He had an MRI performed and results are available to review today  He has not performed physical therapy or steroid injection for the knee pain  He works as a  and the pain symptoms he has been experiencing disrupts his ability to comfortably perform his tasks at work  He notes feeling weakness in the left knee when going up and down stairs            Knee Surgical History:  None    Past Medical, Social and Family History:  Past Medical History:   Diagnosis Date   • MS (multiple sclerosis) ([facility] [address] )      Past Surgical History:   Procedure Laterality Date   • BACK SURGERY     • HAND SURGERY Right 2011     No Known Allergies  Current Outpatient Medications on File Prior to Visit   Medication Sig Dispense Refill   • diclofenac (VOLTAREN) 75 mg EC tablet Take 1 tablet (75 mg total) by mouth 2 (two) times a day 60 tablet 5   • Dimethyl Fumarate 240 MG CPDR Take 1 capsule by mouth twice a day  60 capsule 10   • ergocalciferol (VITAMIN D2) 50,000 units Take 1 capsule (50,000 Units total) by mouth once a week 8 capsule 0   • magnesium Oxide (MAG-OX) 400 mg TABS TAKE 1 TABLET BY MOUTH TWICE A DAY 60 tablet 3   • modafinil (PROVIGIL) 100 mg tablet Take 1 po daily as needed 30 tablet 0   • nicotine (NICODERM CQ) 14 mg/24hr TD 24 hr patch apply 1 patch TO CLEAN, DRY, AND INTACT SKIN REMOVE AND REPLACE once daily     • nicotine (NICODERM CQ) 21 mg/24 hr TD 24 hr patch      • NON FORMULARY Medical Marijuana - daily prn     • pregabalin (LYRICA) 75 mg capsule Take 1 capsule (75 mg total) by mouth 2 (two) times a day 60 capsule 2     No current facility-administered medications on file prior to visit  Social History     Socioeconomic History   • Marital status: /Civil Union     Spouse name: Not on file   • Number of children: Not on file   • Years of education: Not on file   • Highest education level: Not on file   Occupational History   • Not on file   Tobacco Use   • Smoking status: Some Days     Packs/day: 0 50     Types: Cigarettes   • Smokeless tobacco: Never   Vaping Use   • Vaping Use: Never used   Substance and Sexual Activity   • Alcohol use: Not Currently   • Drug use: Not Currently     Types: Marijuana     Comment: medical marijuana   • Sexual activity: Yes     Partners: Female   Other Topics Concern   • Not on file   Social History Narrative   • Not on file     Social Determinants of Health     Financial Resource Strain: Not on file   Food Insecurity: Not on file   Transportation Needs: Not on file   Physical Activity: Not on file   Stress: Not on file   Social Connections: Not on file   Intimate Partner Violence: Not on file   Housing Stability: Not on file         I have reviewed the past medical, surgical, social and family history, medications and allergies as documented in the EMR      Review of systems: RODOLFO is negative other than that noted in the HPI  Constitutional: Negative for fatigue and fever  Physical Exam:    Blood pressure 119/80, height 5' 8" (1 727 m), weight 79 4 kg (175 lb)  General/Constitutional: NAD, well developed, well nourished  HENT: Normocephalic, atraumatic  CV: Intact distal pulses, regular rate  Resp: No respiratory distress or labored breathing  Lymphatic: No lymphadenopathy palpated  Neuro: Alert and Oriented x 3, no focal deficits  Psych: Normal mood, normal affect, normal judgement, normal behavior  Skin: Warm, dry, no rashes, no erythema      Knee Exam (focused):  Visual inspection of the left knee demonstrates normal contour without atrophy  There is no significant erythema or edema  No significant joint effusion   Range of motion is from 0-110 degrees of flexion   Able to straight leg raise   Positive tender to palpation of the distal medial and lateral patellar borders  Negative medial joint line tenderness, negative lateral joint line tenderness  Negative medial Quinn's, negative lateral Quinn's  Negative Lachman exam, negative posterior drawer  Stable to varus and valgus stress at both 0 and 30°  Patella tracks with crepitus  Positive patellar compression sign  No J sign  No apprehension  Translation is approximately 2 quadrants and is equal to the contralateral side  Patellar eversion is similar to the contralateral side    Examination of the patient's ipsilateral hip demonstrates full painless range of motion  No crepitus  LE NV Exam: +2 DP/PT pulses bilaterally  Sensation intact to light touch L2-S1 bilaterally    No calf tenderness to palpation bilaterally      Knee Imaging    MRI of the left knee demonstrates thickening of MCL suggesting chronic sprain  No tear of ACL, PCL, LCL seen  No meniscal tear seen  Bony fragment from tibial tuberosity due to previous Osgood Schlatter disease seen  Thinning of patellofemoral cartilage   Agree with [facility] interpretation  Large joint arthrocentesis: L knee  Universal Protocol:  Consent: Verbal consent obtained  Written consent not obtained  Risks and benefits: risks, benefits and alternatives were discussed  Consent given by: patient  Time out: Immediately prior to procedure a "time out" was called to verify the correct patient, procedure, equipment, support staff and site/side marked as required  Timeout called at: 1/3/2023 5:35 PM   Patient understanding: patient states understanding of the procedure being performed  Patient consent: the patient's understanding of the procedure matches consent given  Procedure consent: procedure consent matches procedure scheduled  Relevant documents: relevant documents present and verified  Test results: test results available and properly labeled  Site marked: the operative site was marked  Radiology Images displayed and confirmed   If images not available, report reviewed: imaging studies available  Patient identity confirmed: verbally with patient    Supporting Documentation  Indications: pain   Procedure Details  Location: knee - L knee  Needle size: 22 G  Ultrasound guidance: no  Approach: anterolateral  Medications administered: 4 mL bupivacaine 0 25 %; 40 mg triamcinolone acetonide 40 mg/mL    Patient tolerance: patient tolerated the procedure well with no immediate complications  Dressing:  Sterile dressing applied        Scribe Attestation    I,:   am acting as a scribe while in the presence of the attending physician :       I,:   personally performed the services described in this documentation    as scribed in my presence :

## 2023-01-16 ENCOUNTER — TELEPHONE (OUTPATIENT)
Dept: OBGYN CLINIC | Facility: HOSPITAL | Age: 43
End: 2023-01-16

## 2023-01-16 ENCOUNTER — TELEPHONE (OUTPATIENT)
Dept: NEUROLOGY | Facility: CLINIC | Age: 43
End: 2023-01-16

## 2023-01-16 NOTE — TELEPHONE ENCOUNTER
Caller: Patient's wife, Giovanna Hernandez     Doctor/Office: Smith/Succasunna     CB#: [phone]      What needs to be faxed: OKSANA jolley     ATTN to: Stella [id number]    Fax#: [phone]

## 2023-01-16 NOTE — TELEPHONE ENCOUNTER
Reminder appt call     Patient is scheduled on 01/23/2023 @ 230 pm with an arrival time  215 pm in the [facility] location with Dr Lux Bernal  Patient confirmed appt

## 2023-01-23 ENCOUNTER — OFFICE VISIT (OUTPATIENT)
Dept: NEUROLOGY | Facility: CLINIC | Age: 43
End: 2023-01-23

## 2023-01-23 VITALS
TEMPERATURE: 97.7 F | DIASTOLIC BLOOD PRESSURE: 76 MMHG | SYSTOLIC BLOOD PRESSURE: 127 MMHG | HEART RATE: 69 BPM | WEIGHT: 175 LBS | BODY MASS INDEX: 26.52 KG/M2 | HEIGHT: 68 IN

## 2023-01-23 DIAGNOSIS — M62.838 MUSCLE SPASMS OF BOTH LOWER EXTREMITIES: ICD-10-CM

## 2023-01-23 DIAGNOSIS — R20.2 PARESTHESIAS IN RIGHT HAND: ICD-10-CM

## 2023-01-23 DIAGNOSIS — G35 MULTIPLE SCLEROSIS (HCC): Primary | ICD-10-CM

## 2023-01-23 DIAGNOSIS — N31.9 NEUROGENIC BLADDER: ICD-10-CM

## 2023-01-23 RX ORDER — MODAFINIL 100 MG/1
100 TABLET ORAL
Qty: 60 TABLET | Refills: 5 | Status: SHIPPED | OUTPATIENT
Start: 2023-01-23

## 2023-01-23 RX ORDER — CYCLOBENZAPRINE HCL 10 MG
10 TABLET ORAL
Qty: 90 TABLET | Refills: 3 | Status: SHIPPED | OUTPATIENT
Start: 2023-01-23

## 2023-01-23 RX ORDER — PREGABALIN 75 MG/1
75 CAPSULE ORAL 2 TIMES DAILY
Qty: 60 CAPSULE | Refills: 2 | Status: SHIPPED | OUTPATIENT
Start: 2023-01-23 | End: 2023-01-23 | Stop reason: SDUPTHER

## 2023-01-23 RX ORDER — PREGABALIN 75 MG/1
75 CAPSULE ORAL 2 TIMES DAILY
Qty: 60 CAPSULE | Refills: 5 | Status: SHIPPED | OUTPATIENT
Start: 2023-01-23

## 2023-01-23 NOTE — PROGRESS NOTES
Patient ID: Sushil Chacon is a 38 y o  male  Assessment/Plan:           Problem List Items Addressed This Visit        Nervous and Auditory    Multiple sclerosis ([facility] [address] ) - Primary    Relevant Medications    cyclobenzaprine (FLEXERIL) 10 mg tablet    pregabalin (LYRICA) 75 mg capsule    modafinil (PROVIGIL) 100 mg tablet    Other Relevant Orders    MRI brain MS wo and w contrast       Other    Paresthesias in right hand    Muscle spasms of both lower extremities    Relevant Medications    cyclobenzaprine (FLEXERIL) 10 mg tablet    Other Relevant Orders    MRI brain MS wo and w contrast    Neurogenic bladder        Mr Glenda Hopkins has presented to [place] [address] multiple sclerosis center for follow-up on multiple sclerosis and related issues  Patient describes no new focal neurological deficit but patient has muscle cramps in his lower extremities requiring adding symptomatic regimen such as Flexeril 10 mg once at night and patient is to continue Lyrica 75 mg twice daily- new prescription sent to the patient pharmacy  Patient responds well to dimethyl fumarate as he had blood work completed in November 2022 with absolute lymphocyte count 1 0 suggestive of patient can safely continue his current regimen  Another serological work-up will be scheduled anytime this month for hepatic panel and CBC with differential     Patient had completed imaging of the brain in February 2022 and imagings of the cervical spine in May 2022-patient will be advised to repeat brain MRI in September - October 2023 to monitor his multiple sclerosis  Patient agreed his experience with modafinil 100 mg was excellent as he was able to had some energy level back and able to proceed with his work-related tasks  Patient will be advised to consider continuing modafinil 100 mg, he may intermittently use 100 mg twice daily before breakfast and before lunchtime      Patient also reported neurogenic bladder-patient did not bring any major concern for hesitancy but bladder urgency was clearly described  Patient is to consider following with urology team     Patient received great support and encouragement from significant other who was present during this office visit  Patient is to follow-up with [facility] neurology team after imaging of the brain completed and available for review  Patient is to follow-up with Leonor Castro in April 2023 and follow-up with me in September 2023  Subjective: weakness o left knee-pt currenlty going to physical therapy, headaches  Patient states feels like a kat horse on both of his calfs most prominent on the right one  Pt here with wife  HPI    Mr Claudell Eon is a 49-year-old male who presented to [address] sclerosis [place] for follow-up  Patient was initially diagnosed with multiple sclerosis at [facility] back in 2020 with imaging and spinal tap completed at that time  Today patient presented with significant other who shows great support -patient has been taking dimethyl fumarate with no disease progression has been noted as patient ambulates without assistive devices with no new focal neurological deficit described since patient was seen by Leonor Castro in November 2022  Patient described no side effects to dimethyl fumarate either  Patient has described weakness in his left knee as he is working with physical therapy with muscle spasm in bilateral lower extremities has been noted to right worse than left  Patient is not on muscle relaxer  Patient has not received Lyrica due to confusion with pharmacies  Patient stated he has dexterity problem with his right hand but he is busy at work and his stiffness in the muscles and joints in his hands has been more noticeable  Patient also described worsening visual dysfunction as he has not changed his glasses and has difficulty sitting great now  Stated he has bladder frequency but no incontinence or hesitancy    Patient also stated with modafinil 100 mg provides excellent benefits, by the time he is absolutely exhausted cognitively physically and early afternoon  No recent infection, no hospitalization  Last MRI of the cervical spine completed in May 2022 and MRI of the brain in February 2022  The following portions of the patient's history were reviewed and updated as appropriate:   He  has a past medical history of [facility]  He   Patient Active Problem List    Diagnosis Date Noted   • Muscle spasms of both lower extremities 01/23/2023   • Neurogenic bladder 01/23/2023   • Blood in stool 04/22/2022   • Tension headache 12/17/2021   • Vitamin D deficiency 02/19/2021   • Carpal tunnel syndrome of right wrist    • Paresthesias in right hand    • Multiple sclerosis ([facility] [address] ) 12/05/2020     He  has a past surgical history that includes Back surgery and Hand surgery (Right, 2011)  His family history includes Cancer in his paternal uncle; Diabetes in his maternal grandfather; Heart disease in his father; Multiple sclerosis in his mother  He  reports that he has been smoking cigarettes  He has been smoking an average of  5 packs per day  He has never used smokeless tobacco  He reports that he does not currently use alcohol  He reports that he does not currently use drugs after having used the following drugs: Marijuana  Current Outpatient Medications   Medication Sig Dispense Refill   • cyclobenzaprine (FLEXERIL) 10 mg tablet Take 1 tablet (10 mg total) by mouth daily at bedtime 90 tablet 3   • Dimethyl Fumarate 240 MG CPDR Take 1 capsule by mouth twice a day   60 capsule 5   • ergocalciferol (VITAMIN D2) 50,000 units Take 1 capsule (50,000 Units total) by mouth once a week 8 capsule 0   • magnesium Oxide (MAG-OX) 400 mg TABS TAKE 1 TABLET BY MOUTH TWICE A DAY 60 tablet 3   • modafinil (PROVIGIL) 100 mg tablet Take 1 tablet (100 mg total) by mouth 2 (two) times a day before breakfast and lunch Take 1 po daily as needed 60 tablet 5   • NON FORMULARY Medical Marijuana - daily prn     • pregabalin (LYRICA) 75 mg capsule Take 1 capsule (75 mg total) by mouth 2 (two) times a day 60 capsule 5   • diclofenac (VOLTAREN) 75 mg EC tablet Take 1 tablet (75 mg total) by mouth 2 (two) times a day (Patient not taking: Reported on 1/23/2023) 60 tablet 5   • nicotine (NICODERM CQ) 14 mg/24hr TD 24 hr patch apply 1 patch TO CLEAN, DRY, AND INTACT SKIN REMOVE AND REPLACE once daily (Patient not taking: Reported on 1/23/2023)     • nicotine (NICODERM CQ) 21 mg/24 hr TD 24 hr patch  (Patient not taking: Reported on 1/23/2023)       No current facility-administered medications for this visit  Current Outpatient Medications on File Prior to Visit   Medication Sig   • Dimethyl Fumarate 240 MG CPDR Take 1 capsule by mouth twice a day  • ergocalciferol (VITAMIN D2) 50,000 units Take 1 capsule (50,000 Units total) by mouth once a week   • magnesium Oxide (MAG-OX) 400 mg TABS TAKE 1 TABLET BY MOUTH TWICE A DAY   • NON FORMULARY Medical Marijuana - daily prn   • [DISCONTINUED] modafinil (PROVIGIL) 100 mg tablet Take 1 po daily as needed   • [DISCONTINUED] pregabalin (LYRICA) 75 mg capsule Take 1 capsule (75 mg total) by mouth 2 (two) times a day   • diclofenac (VOLTAREN) 75 mg EC tablet Take 1 tablet (75 mg total) by mouth 2 (two) times a day (Patient not taking: Reported on 1/23/2023)   • nicotine (NICODERM CQ) 14 mg/24hr TD 24 hr patch apply 1 patch TO CLEAN, DRY, AND INTACT SKIN REMOVE AND REPLACE once daily (Patient not taking: Reported on 1/23/2023)   • nicotine (NICODERM CQ) 21 mg/24 hr TD 24 hr patch  (Patient not taking: Reported on 1/23/2023)     No current facility-administered medications on file prior to visit  He has No Known Allergies            Objective:    Blood pressure 127/76, pulse 69, temperature 97 7 °F (36 5 °C), temperature source Skin, height 5' 8" (1 727 m), weight 79 4 kg (175 lb)  Physical Exam    Neurological Exam  CONSTITUTIONAL: NAD, pleasant  NECK: supple, no lymphadenopathy, no thyromegaly, no JVD  CARDIOVASCULAR: RRR, normal S1S2, no murmurs, no rubs  RESP: clear to auscultation bilaterally, no wheezes/rhonchi/rales  ABDOMEN: soft, non tender, non distended  SKIN: no rash or skin lesions  EXTREMITIES: no edema, pulses 2+bilaterally  PSYCH: appropriate mood and affect  NEUROLOGIC COMPREHENSIVE EXAM: Patient is oriented to person, place and time, NAD; appropriate affect  CN II, III, IV, V, VI, VII,VIII,IX,X,XI-XII intact with EOMI, PERRLA, OKN intact, VF grossly intact, fundi poorly visualized secondary to pupillary constriction; symmetric face noted  Motor: 5/5 UE/LE bilateral symmetric; Sensory: intact to light touch and pinprick bilaterally; normal vibration sensation feet bilaterally; Coordination within normal limits on FTN and TAE testing; DTR: 2/4 through, no Babinski, no clonus  Tandem gait is intact  Romberg: absent  ROS:  12 points of review of system was reviewed with the patient and was unremarkable with exception: see HPI  Review of Systems   Constitutional: Negative  Negative for appetite change and fever  HENT: Negative  Negative for hearing loss, tinnitus, trouble swallowing and voice change  Eyes: Negative  Negative for photophobia, pain and visual disturbance  Respiratory: Negative  Negative for shortness of breath  Cardiovascular: Negative  Negative for palpitations  Gastrointestinal: Negative  Negative for nausea and vomiting  Endocrine: Negative  Negative for cold intolerance  Genitourinary: Negative  Negative for dysuria, frequency and urgency  Musculoskeletal: Negative  Negative for gait problem, myalgias and neck pain  Skin: Negative  Negative for rash  Allergic/Immunologic: Negative  Neurological: Positive for weakness (left knee-Pt currently going to Physical therapy) and headaches   Negative for dizziness, tremors, seizures, syncope, facial asymmetry, speech difficulty, light-headedness and numbness  Patient states feels like a kat horse on both of his calfs most prominent on the right one  Hematological: Negative  Does not bruise/bleed easily  Psychiatric/Behavioral: Negative  Negative for confusion, hallucinations and sleep disturbance

## 2023-01-24 LAB
ALBUMIN SERPL-MCNC: 4.4 G/DL (ref 4–5)
ALP SERPL-CCNC: 47 IU/L (ref 44–121)
ALT SERPL-CCNC: 37 IU/L (ref 0–44)
AST SERPL-CCNC: 25 IU/L (ref 0–40)
BASOPHILS # BLD AUTO: 0.1 X10E3/UL (ref 0–0.2)
BASOPHILS NFR BLD AUTO: 1 %
BILIRUB DIRECT SERPL-MCNC: 0.1 MG/DL (ref 0–0.4)
BILIRUB SERPL-MCNC: 0.5 MG/DL (ref 0–1.2)
EOSINOPHIL # BLD AUTO: 0.2 X10E3/UL (ref 0–0.4)
EOSINOPHIL NFR BLD AUTO: 3 %
ERYTHROCYTE [DISTWIDTH] IN BLOOD BY AUTOMATED COUNT: 13 % (ref 11.6–15.4)
HCT VFR BLD AUTO: 39.2 % (ref 37.5–51)
HGB BLD-MCNC: 13.5 G/DL (ref 13–17.7)
IMM GRANULOCYTES # BLD: 0 X10E3/UL (ref 0–0.1)
IMM GRANULOCYTES NFR BLD: 0 %
LYMPHOCYTES # BLD AUTO: 0.8 X10E3/UL (ref 0.7–3.1)
LYMPHOCYTES NFR BLD AUTO: 12 %
MCH RBC QN AUTO: 31.4 PG (ref 26.6–33)
MCHC RBC AUTO-ENTMCNC: 34.4 G/DL (ref 31.5–35.7)
MCV RBC AUTO: 91 FL (ref 79–97)
MONOCYTES # BLD AUTO: 0.7 X10E3/UL (ref 0.1–0.9)
MONOCYTES NFR BLD AUTO: 10 %
NEUTROPHILS # BLD AUTO: 5.2 X10E3/UL (ref 1.4–7)
NEUTROPHILS NFR BLD AUTO: 74 %
PLATELET # BLD AUTO: 305 X10E3/UL (ref 150–450)
PROT SERPL-MCNC: 6.5 G/DL (ref 6–8.5)
RBC # BLD AUTO: 4.3 X10E6/UL (ref 4.14–5.8)
WBC # BLD AUTO: 6.9 X10E3/UL (ref 3.4–10.8)

## 2023-02-01 DIAGNOSIS — M25.50 ARTHRALGIA, UNSPECIFIED JOINT: ICD-10-CM

## 2023-02-01 RX ORDER — DICLOFENAC SODIUM 75 MG/1
75 TABLET, DELAYED RELEASE ORAL 2 TIMES DAILY
Qty: 60 TABLET | Refills: 3 | Status: SHIPPED | OUTPATIENT
Start: 2023-02-01 | End: 2023-03-03

## 2023-02-22 ENCOUNTER — TELEPHONE (OUTPATIENT)
Dept: NEUROLOGY | Facility: CLINIC | Age: 43
End: 2023-02-22

## 2023-02-22 NOTE — TELEPHONE ENCOUNTER
February 18, 2023  Margarita [facility] (supporting Pat Foote MD)      3:32 PM  We were told by [facility] that the provigil/modafanil prescription was sent back to your office for approval by the insurance once again and they haven't received any response   Please let me tony ventura to do about

## 2023-02-22 NOTE — TELEPHONE ENCOUNTER
Called [facility]  Spoke w/Guzman  He advised that Modafinil now requires a PA due to dose increase  Insurance only covers 30 per 30  Dose increase to BID  PA Key: [id number] for Modafinil 100mg submitted on [facility]  MyChart msg sent to pt  Awaiting determination

## 2023-02-28 NOTE — TELEPHONE ENCOUNTER
PA for modafinil 100 BID approved from 2/23/23 - 11/12/23  [employer] msg sent to patient advising him of approval     Called [facility]  Spoke w/Pamela  She was able to receive a paid claim  She will get the order ready for this afternoon

## 2023-04-06 LAB
ALBUMIN SERPL-MCNC: 4.6 G/DL (ref 4–5)
ALP SERPL-CCNC: 60 IU/L (ref 44–121)
ALT SERPL-CCNC: 40 IU/L (ref 0–44)
AST SERPL-CCNC: 35 IU/L (ref 0–40)
BASOPHILS # BLD AUTO: 0.1 X10E3/UL (ref 0–0.2)
BASOPHILS NFR BLD AUTO: 1 %
BILIRUB DIRECT SERPL-MCNC: 0.06 MG/DL (ref 0–0.4)
BILIRUB SERPL-MCNC: 0.3 MG/DL (ref 0–1.2)
EOSINOPHIL # BLD AUTO: 0.2 X10E3/UL (ref 0–0.4)
EOSINOPHIL NFR BLD AUTO: 4 %
ERYTHROCYTE [DISTWIDTH] IN BLOOD BY AUTOMATED COUNT: 12.4 % (ref 11.6–15.4)
HCT VFR BLD AUTO: 41 % (ref 37.5–51)
HGB BLD-MCNC: 14.2 G/DL (ref 13–17.7)
IMM GRANULOCYTES # BLD: 0 X10E3/UL (ref 0–0.1)
IMM GRANULOCYTES NFR BLD: 1 %
LYMPHOCYTES # BLD AUTO: 0.8 X10E3/UL (ref 0.7–3.1)
LYMPHOCYTES NFR BLD AUTO: 13 %
MCH RBC QN AUTO: 31.4 PG (ref 26.6–33)
MCHC RBC AUTO-ENTMCNC: 34.6 G/DL (ref 31.5–35.7)
MCV RBC AUTO: 91 FL (ref 79–97)
MONOCYTES # BLD AUTO: 0.6 X10E3/UL (ref 0.1–0.9)
MONOCYTES NFR BLD AUTO: 10 %
NEUTROPHILS # BLD AUTO: 4.5 X10E3/UL (ref 1.4–7)
NEUTROPHILS NFR BLD AUTO: 71 %
PLATELET # BLD AUTO: 334 X10E3/UL (ref 150–450)
PROT SERPL-MCNC: 7.2 G/DL (ref 6–8.5)
RBC # BLD AUTO: 4.52 X10E6/UL (ref 4.14–5.8)
WBC # BLD AUTO: 6.2 X10E3/UL (ref 3.4–10.8)

## 2023-06-07 DIAGNOSIS — M25.50 ARTHRALGIA, UNSPECIFIED JOINT: ICD-10-CM

## 2023-06-07 RX ORDER — DICLOFENAC SODIUM 75 MG/1
TABLET, DELAYED RELEASE ORAL
Qty: 60 TABLET | Refills: 3 | Status: SHIPPED | OUTPATIENT
Start: 2023-06-07

## 2023-07-11 ENCOUNTER — TELEPHONE (OUTPATIENT)
Dept: NEUROLOGY | Facility: CLINIC | Age: 43
End: 2023-07-11

## 2023-07-11 NOTE — TELEPHONE ENCOUNTER
7/10 3:42    Aishwarya from [facility] leftas VM re: med that requires PA. Transcribed VM: Hi my name is Clarine Lombard and I'm calling from [facility] regarding patient Rosy Wynn. YOB: 1980. The reason for this call is that we have a prescription that needs a pre authorization, so I'm calling to check the status of that request. If you can, please call us back us. Our number is [phone]. Thank you, have a good day.

## 2023-07-13 ENCOUNTER — TELEPHONE (OUTPATIENT)
Dept: NEUROLOGY | Facility: CLINIC | Age: 43
End: 2023-07-13

## 2023-07-13 NOTE — TELEPHONE ENCOUNTER
Please let patient know one of his liver enzymes is slightly elevated on his blood work. I would like him to repeat labs again just prior to his next appt in September (so in 2 months). I am pretty sure he has a standing order, please just double check.   Thanks

## 2023-07-13 NOTE — TELEPHONE ENCOUNTER
Received VM from [employer]. Dimethyl Furamate 240 mg requires a new PA. Team 3, would you kindly assist? Thank you!

## 2023-07-17 NOTE — TELEPHONE ENCOUNTER
BIN: [id number]   PCN: [id number]  ID: [id number]    Ku: [id number]    PA submitted via [facility]. Awaiting determination.

## 2023-07-17 NOTE — TELEPHONE ENCOUNTER
Spoke with pt and advised him of Gaby's message. There are currently standing orders for labs. Pt verbalizes understanding. Pt to get blood work prior to appt in September.

## 2023-07-17 NOTE — TELEPHONE ENCOUNTER
PA approved from 7/17/2023 to 7/17/2024. Approval [facility].

## 2023-08-09 DIAGNOSIS — G35 MULTIPLE SCLEROSIS (HCC): ICD-10-CM

## 2023-08-09 RX ORDER — MODAFINIL 100 MG/1
TABLET ORAL
Qty: 60 TABLET | Refills: 0 | Status: SHIPPED | OUTPATIENT
Start: 2023-08-09

## 2023-08-15 DIAGNOSIS — R51.9 HEADACHE: ICD-10-CM

## 2023-08-15 RX ORDER — LANOLIN ALCOHOL/MO/W.PET/CERES
CREAM (GRAM) TOPICAL
Qty: 60 TABLET | Refills: 3 | Status: SHIPPED | OUTPATIENT
Start: 2023-08-15

## 2023-08-23 DIAGNOSIS — G35 MULTIPLE SCLEROSIS (HCC): ICD-10-CM

## 2023-08-24 RX ORDER — DIMETHYL FUMARATE 240 MG/1
CAPSULE ORAL
Qty: 60 CAPSULE | Refills: 10 | Status: SHIPPED | OUTPATIENT
Start: 2023-08-24

## 2023-09-12 DIAGNOSIS — M25.50 ARTHRALGIA, UNSPECIFIED JOINT: ICD-10-CM

## 2023-09-12 RX ORDER — DICLOFENAC SODIUM 75 MG/1
TABLET, DELAYED RELEASE ORAL
Qty: 60 TABLET | Refills: 3 | Status: SHIPPED | OUTPATIENT
Start: 2023-09-12

## 2023-09-23 ENCOUNTER — HOSPITAL ENCOUNTER (OUTPATIENT)
Dept: MRI IMAGING | Facility: HOSPITAL | Age: 43
Discharge: HOME | End: 2023-09-23
Attending: PSYCHIATRY & NEUROLOGY
Payer: COMMERCIAL

## 2023-09-23 DIAGNOSIS — M62.838 MUSCLE SPASMS OF BOTH LOWER EXTREMITIES: ICD-10-CM

## 2023-09-23 DIAGNOSIS — G35 MULTIPLE SCLEROSIS (HCC): ICD-10-CM

## 2023-09-23 PROCEDURE — 70553 MRI BRAIN STEM W/O & W/DYE: CPT

## 2023-09-23 PROCEDURE — A9585 GADOBUTROL INJECTION: HCPCS | Performed by: PSYCHIATRY & NEUROLOGY

## 2023-09-23 PROCEDURE — G1004 CDSM NDSC: HCPCS

## 2023-09-23 RX ORDER — GADOBUTROL 604.72 MG/ML
8 INJECTION INTRAVENOUS
Status: COMPLETED | OUTPATIENT
Start: 2023-09-23 | End: 2023-09-23

## 2023-09-23 RX ADMIN — GADOBUTROL 8 ML: 604.72 INJECTION INTRAVENOUS at 16:56

## 2023-09-29 ENCOUNTER — OFFICE VISIT (OUTPATIENT)
Dept: NEUROLOGY | Facility: CLINIC | Age: 43
End: 2023-09-29
Payer: COMMERCIAL

## 2023-09-29 VITALS
WEIGHT: 175 LBS | DIASTOLIC BLOOD PRESSURE: 68 MMHG | HEIGHT: 67 IN | HEART RATE: 92 BPM | TEMPERATURE: 97.3 F | SYSTOLIC BLOOD PRESSURE: 122 MMHG | BODY MASS INDEX: 27.47 KG/M2 | OXYGEN SATURATION: 98 %

## 2023-09-29 DIAGNOSIS — R06.83 SNORING: Primary | ICD-10-CM

## 2023-09-29 DIAGNOSIS — G47.19 EXCESSIVE DAYTIME SLEEPINESS: ICD-10-CM

## 2023-09-29 DIAGNOSIS — G35 MULTIPLE SCLEROSIS (HCC): ICD-10-CM

## 2023-09-29 PROCEDURE — 99215 OFFICE O/P EST HI 40 MIN: CPT | Performed by: PSYCHIATRY & NEUROLOGY

## 2023-09-29 RX ORDER — MODAFINIL 200 MG/1
200 TABLET ORAL DAILY
Qty: 30 TABLET | Refills: 5 | Status: SHIPPED | OUTPATIENT
Start: 2023-09-29 | End: 2023-10-02 | Stop reason: SDUPTHER

## 2023-09-29 RX ORDER — MODAFINIL 200 MG/1
200 TABLET ORAL DAILY
Qty: 30 TABLET | Refills: 5 | Status: SHIPPED | OUTPATIENT
Start: 2023-09-29 | End: 2023-09-29 | Stop reason: SDUPTHER

## 2023-09-29 NOTE — PROGRESS NOTES
Patient ID: Alycia Lombard is a 38 y.o. male. Assessment/Plan:           Problem List Items Addressed This Visit        Nervous and Auditory    Multiple sclerosis ([address])    Relevant Medications    modafinil (PROVIGIL) 200 MG tablet   Other Visit Diagnoses     Snoring    -  Primary    Relevant Orders    Ambulatory Referral to Sleep Medicine    Excessive daytime sleepiness        Relevant Medications    modafinil (PROVIGIL) 200 MG tablet    Other Relevant Orders    Ambulatory Referral to Sleep Medicine         Mr. Etienne Louise has presented to [facility] multiple sclerosis center for follow-up on multiple sclerosis and related issues. Main concern we discussed today is fatigue with excessive daytime sleepiness despite patient using modafinil 200 mg in the morning. We discussed patient fatigue is multifactorial including patient has been snoring at night and at times stop breathing with excessive daytime sleepiness bring concern for obstructive sleep apnea. Referral was provided to follow-up with sleep medicine team for evaluation and treatment. Patient has been taking dimethyl fumarate 240 mg twice daily with no side effects reported. Serologic work-up completed in September suggest patient has known liver dysfunction with absolute lymphocyte count 900 suggestive against any immunosuppressive tendency as patient may continue same dose going forward. Patient completed imaging of the brain with no disease progression appreciated, no signs of atrophy or worsening T1 black holes noted. Patient does have moderate burden of demyelination in his cervical spine with multilevel degenerative disc disease with severe foraminal stenosis. Patient has full strength in his upper extremities and his lower extremities which is just patient has been exercising for his job which benefits to his multiple sclerosis. We also discussed worsening muscle cramps in lower extremities despite taking Flexeril 10 mg at night.   Patient is to follow-up with pharmacist at medical [facility] dispensary's to adjust patient regimen. Patient ambulates without assistive devices. Patient is planning to adjust his insurance switch to a new 1 and patient brought to question whether insurance can be accepted by [facility] neurology practice. Patient is to follow-up with Marvel Welch within 6 months. Subjective: pain increase he says due to weather maybe, family has some questions about insurance issues with medications    HPI  Mr. Raffy Wong has presented to [facility] multiple sclerosis center for follow-up on multiple sclerosis and related issues. Patient describes no new focal neurological deficit but patient has muscle cramps in his lower extremities requiring adding symptomatic regimen such as Flexeril 10 mg once at night. Patient was not able to tolerate Lyrica 75 mg twice daily- GI dysfunction was described. Patient responds well to dimethyl fumarate as he had blood work completed in December 2023.     Patient had completed imaging of the brain in February 2022 and imagings of the cervical spine in May 2022-patient will be advised to repeat brain MRI in September - October 2023 to monitor his multiple sclerosis.   MRI brain 9/2023: No interval change. Stable moderate demyelinating disease. No definite new lesions identified and no abnormal enhancement to suggest the presence of active demyelination. Patient agreed his experience with modafinil 200 mg was excellent as he was able to had some energy level back and able to proceed with his work-related tasks. Patient was described patient has been snoring and nighttime stopped breathing. Patient reports he has 8 hours sleep uninterrupted. Patient has excessive daytime sleepiness starting in the morning. Charley horses in the right thigh area has been overwhelming.   Patient has painful hands but no weakness and no sensory loss.     Patient also reported neurogenic bladder-patient did not bring any major concern for hesitancy but bladder urgency was clearly described. Patient is to consider following with urology team.     MRI C-spine : There are patchy areas of T2 cord signal hyperintensity consistent with the clinical history of multiple sclerosis. There is no abnormal enhancement to suggest presence of active demyelination.     There is a demyelinating plaque involving the right lateral column at C1-C2. There is involvement of the left lateral cord at C2. There is involvement of the left dorsal lateral cord at C2-C3. There is involvement of the dorsal cord at C3-C4. There   is involvement of the lateral columns at C4-C5.     PREVERTEBRAL AND PARASPINAL SOFT TISSUES:  There is a T2 hyperintense nodule within the right thyroid lobe.     VISUALIZED POSTERIOR FOSSA:  The visualized posterior fossa demonstrates no abnormal signal.  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  This is unchanged since prior MRI.       The following portions of the patient's history were reviewed and updated as appropriate:   He  has a past medical history of MS (multiple sclerosis) ([address]). He   Patient Active Problem List    Diagnosis Date Noted   • Muscle spasms of both lower extremities 01/23/2023   • Neurogenic bladder 01/23/2023   • Blood in stool 04/22/2022   • Tension headache 12/17/2021   • Vitamin D deficiency 02/19/2021   • Carpal tunnel syndrome of right wrist    • Paresthesias in right hand    • Multiple sclerosis ([address]) 12/05/2020     He  has a past surgical history that includes Back surgery and Hand surgery (Right, 2011). His family history includes Cancer in his paternal uncle; Diabetes in his maternal grandfather; Heart disease in his father; Multiple sclerosis in his mother. He  reports that he has been smoking cigarettes. He has been smoking an average of .5 packs per day.  He has never used smokeless tobacco. He reports that he does not currently use alcohol. He reports that he does not currently use drugs after having used the following drugs: Marijuana. Current Outpatient Medications   Medication Sig Dispense Refill   • [facility]S D3 25 MCG (1000 UT) capsule Take 1,000 Units by mouth daily     • cyclobenzaprine (FLEXERIL) 10 mg tablet Take 1 tablet (10 mg total) by mouth daily at bedtime 90 tablet 3   • diclofenac (VOLTAREN) 75 mg EC tablet TAKE 1 TABLET BY MOUTH TWICE A DAY 60 tablet 3   • Dimethyl Fumarate 240 MG CPDR Take 1 capsule by mouth twice a day. 60 capsule 10   • magnesium Oxide (MAG-OX) 400 mg TABS TAKE 1 TABLET BY MOUTH TWICE A DAY 60 tablet 3   • modafinil (PROVIGIL) 200 MG tablet Take 1 tablet (200 mg total) by mouth daily 30 tablet 5   • nicotine (NICODERM CQ) 14 mg/24hr TD 24 hr patch      • nicotine (NICODERM CQ) 21 mg/24 hr TD 24 hr patch      • NON FORMULARY Medical Marijuana - daily prn     • ergocalciferol (VITAMIN D2) 50,000 units Take 1 capsule (50,000 Units total) by mouth once a week (Patient not taking: Reported on 4/21/2023) 8 capsule 0     No current facility-administered medications for this visit. Current Outpatient Medications on File Prior to Visit   Medication Sig   • CVS D3 25 MCG (1000 UT) capsule Take 1,000 Units by mouth daily   • cyclobenzaprine (FLEXERIL) 10 mg tablet Take 1 tablet (10 mg total) by mouth daily at bedtime   • diclofenac (VOLTAREN) 75 mg EC tablet TAKE 1 TABLET BY MOUTH TWICE A DAY   • Dimethyl Fumarate 240 MG CPDR Take 1 capsule by mouth twice a day.    • magnesium Oxide (MAG-OX) 400 mg TABS TAKE 1 TABLET BY MOUTH TWICE A DAY   • nicotine (NICODERM CQ) 14 mg/24hr TD 24 hr patch    • nicotine (NICODERM CQ) 21 mg/24 hr TD 24 hr patch    • NON FORMULARY Medical Marijuana - daily prn   • [DISCONTINUED] modafinil (PROVIGIL) 100 mg tablet TAKE 1 TABLET BY MOUTH TWICE A DAY BEFORE BREAKFAST & LUNCH AS NEEDED   • ergocalciferol (VITAMIN D2) 50,000 units Take 1 capsule (50,000 Units total) by mouth once a week (Patient not taking: Reported on 4/21/2023)     No current facility-administered medications on file prior to visit. He has No Known Allergies. .         Objective:    Blood pressure 122/68, pulse 92, temperature (!) 97.3 °F (36.3 °C), temperature source Temporal, height 5' 7" (1.702 m), weight 79.4 kg (175 lb), SpO2 98 %. Physical Exam    Neurological Exam    CONSTITUTIONAL: NAD, pleasant. NECK: supple, no lymphadenopathy, no thyromegaly, no JVD. CARDIOVASCULAR: RRR, normal S1S2, no murmurs, no rubs. RESP: clear to auscultation bilaterally, no wheezes/rhonchi/rales. ABDOMEN: soft, non tender, non distended. SKIN: no rash or skin lesions. EXTREMITIES: no edema, pulses 2+bilaterally. PSYCH: appropriate mood and affect  NEUROLOGIC COMPREHENSIVE EXAM: Patient is oriented to person, place and time, NAD; appropriate affect. CN II, III, IV, V, VI, VII,VIII,IX,X,XI-XII intact with EOMI, PERRLA, OKN intact, VF grossly intact, fundi poorly visualized secondary to pupillary constriction; symmetric face noted. Motor: 5/5 UE/LE bilateral symmetric; Sensory: intact to light touch and pinprick bilaterally; normal vibration sensation feet bilaterally; Coordination within normal limits on FTN and TAE testing; DTR: 2/4 through, no Babinski, no clonus. Tandem gait is intact. Romberg: absent. ROS:    Review of Systems   Constitutional: Positive for fatigue. Negative for appetite change and fever. HENT: Negative. Negative for hearing loss, tinnitus, trouble swallowing and voice change. Eyes: Negative. Negative for photophobia, pain and visual disturbance. Respiratory: Negative. Negative for shortness of breath. Cardiovascular: Negative. Negative for palpitations. Gastrointestinal: Negative. Negative for nausea and vomiting. Endocrine: Negative. Negative for cold intolerance. Genitourinary: Negative. Negative for dysuria, frequency and urgency.    Musculoskeletal: Positive for back pain and gait problem (T25FW 9.22 seconds). Negative for myalgias and neck pain. Skin: Negative. Negative for rash. Allergic/Immunologic: Negative. Neurological: Negative for dizziness, tremors, seizures, syncope, facial asymmetry, speech difficulty, light-headedness, numbness and headaches. Hematological: Negative. Does not bruise/bleed easily. Psychiatric/Behavioral: Negative. Negative for confusion, hallucinations and sleep disturbance.

## 2023-10-09 ENCOUNTER — TELEPHONE (OUTPATIENT)
Dept: NEUROLOGY | Facility: CLINIC | Age: 43
End: 2023-10-09

## 2023-10-09 ENCOUNTER — PATIENT MESSAGE (OUTPATIENT)
Dept: NEUROLOGY | Facility: CLINIC | Age: 43
End: 2023-10-09

## 2023-10-09 NOTE — TELEPHONE ENCOUNTER
PA required for Modafinil 200 mg tablets. Key: [id number]    PA submitted via [employer]. Awaiting determination.

## 2023-10-11 NOTE — TELEPHONE ENCOUNTER
Modafinil PA approved from 10/9/2023 to 10/9/2024. LVM with pharmacy informing them of same. Called pt and informed him as well. Pt appreciative. Nothing further at this time.

## 2023-12-05 DIAGNOSIS — R51.9 HEADACHE: ICD-10-CM

## 2023-12-05 RX ORDER — LANOLIN ALCOHOL/MO/W.PET/CERES
CREAM (GRAM) TOPICAL
Qty: 60 TABLET | Refills: 3 | Status: SHIPPED | OUTPATIENT
Start: 2023-12-05

## 2024-01-04 DIAGNOSIS — M25.50 ARTHRALGIA, UNSPECIFIED JOINT: ICD-10-CM

## 2024-01-04 RX ORDER — DICLOFENAC SODIUM 75 MG/1
TABLET, DELAYED RELEASE ORAL
Qty: 60 TABLET | Refills: 3 | Status: SHIPPED | OUTPATIENT
Start: 2024-01-04

## 2024-01-24 DIAGNOSIS — M62.838 MUSCLE SPASMS OF BOTH LOWER EXTREMITIES: ICD-10-CM

## 2024-01-24 DIAGNOSIS — G35 MULTIPLE SCLEROSIS (HCC): ICD-10-CM

## 2024-01-25 RX ORDER — CYCLOBENZAPRINE HCL 10 MG
10 TABLET ORAL
Qty: 30 TABLET | Refills: 11 | Status: SHIPPED | OUTPATIENT
Start: 2024-01-25

## 2024-02-15 ENCOUNTER — TELEPHONE (OUTPATIENT)
Dept: NEUROLOGY | Facility: CLINIC | Age: 44
End: 2024-02-15

## 2024-02-15 DIAGNOSIS — G35 MULTIPLE SCLEROSIS (HCC): Primary | ICD-10-CM

## 2024-02-15 NOTE — TELEPHONE ENCOUNTER
Gaby Larsen PA-C  2/15/2024  2:27 PM EST Back to Top      Please let patient know his abs lymphs are low at 700.  I would like him to repeat CBC in 2 weeks.  Order in chart.       _____________________________________________    Advised pt of results and recommendations below. He verbalized understanding. Will have labs redone in two weeks.

## 2024-03-05 DIAGNOSIS — R51.9 HEADACHE: ICD-10-CM

## 2024-03-05 RX ORDER — LANOLIN ALCOHOL/MO/W.PET/CERES
CREAM (GRAM) TOPICAL
Qty: 60 TABLET | Refills: 3 | Status: SHIPPED | OUTPATIENT
Start: 2024-03-05

## 2024-03-07 LAB
BASOPHILS # BLD AUTO: 0.1 X10E3/UL (ref 0–0.2)
BASOPHILS NFR BLD AUTO: 1 %
EOSINOPHIL # BLD AUTO: 0.2 X10E3/UL (ref 0–0.4)
EOSINOPHIL NFR BLD AUTO: 4 %
ERYTHROCYTE [DISTWIDTH] IN BLOOD BY AUTOMATED COUNT: 12.7 % (ref 11.6–15.4)
HCT VFR BLD AUTO: 40.3 % (ref 37.5–51)
HGB BLD-MCNC: 13.7 G/DL (ref 13–17.7)
IMM GRANULOCYTES # BLD: 0 X10E3/UL (ref 0–0.1)
IMM GRANULOCYTES NFR BLD: 1 %
LYMPHOCYTES # BLD AUTO: 0.9 X10E3/UL (ref 0.7–3.1)
LYMPHOCYTES NFR BLD AUTO: 15 %
MCH RBC QN AUTO: 31.4 PG (ref 26.6–33)
MCHC RBC AUTO-ENTMCNC: 34 G/DL (ref 31.5–35.7)
MCV RBC AUTO: 92 FL (ref 79–97)
MONOCYTES # BLD AUTO: 0.6 X10E3/UL (ref 0.1–0.9)
MONOCYTES NFR BLD AUTO: 10 %
NEUTROPHILS # BLD AUTO: 4.1 X10E3/UL (ref 1.4–7)
NEUTROPHILS NFR BLD AUTO: 69 %
PLATELET # BLD AUTO: 325 X10E3/UL (ref 150–450)
RBC # BLD AUTO: 4.36 X10E6/UL (ref 4.14–5.8)
WBC # BLD AUTO: 5.8 X10E3/UL (ref 3.4–10.8)

## 2024-03-12 ENCOUNTER — OFFICE VISIT (OUTPATIENT)
Dept: SLEEP CENTER | Facility: HOSPITAL | Age: 44
End: 2024-03-12
Attending: PSYCHIATRY & NEUROLOGY
Payer: COMMERCIAL

## 2024-03-12 VITALS
SYSTOLIC BLOOD PRESSURE: 142 MMHG | HEIGHT: 68 IN | OXYGEN SATURATION: 95 % | WEIGHT: 173 LBS | HEART RATE: 100 BPM | BODY MASS INDEX: 26.22 KG/M2 | DIASTOLIC BLOOD PRESSURE: 90 MMHG

## 2024-03-12 DIAGNOSIS — R06.83 SNORING: Primary | ICD-10-CM

## 2024-03-12 DIAGNOSIS — G47.19 EXCESSIVE DAYTIME SLEEPINESS: ICD-10-CM

## 2024-03-12 DIAGNOSIS — F17.210 CIGARETTE SMOKER: ICD-10-CM

## 2024-03-12 DIAGNOSIS — G35 MULTIPLE SCLEROSIS (HCC): ICD-10-CM

## 2024-03-12 DIAGNOSIS — Z79.899 MEDICAL MARIJUANA USE: ICD-10-CM

## 2024-03-12 DIAGNOSIS — R06.81 WITNESSED EPISODE OF APNEA: ICD-10-CM

## 2024-03-12 DIAGNOSIS — G47.62 NOCTURNAL LEG CRAMPS: ICD-10-CM

## 2024-03-12 PROCEDURE — 99244 OFF/OP CNSLTJ NEW/EST MOD 40: CPT | Performed by: INTERNAL MEDICINE

## 2024-03-12 NOTE — PROGRESS NOTES
Consultation - Sleep Center   Elvis Hart  43 y.o. male  :1980  MRN:[id number]  DOS:3/12/2024    Physician Requesting Consult: Autumn Still, Nathaniel             Reason for Consult : At your kind request I saw Elvis Hart for initial sleep evaluation today. The patient is here to evaluate for suspected Obstructive Sleep Apnea.      PFSH, Problem List, Medications & Allergies were reviewed in EMR.    Elvis  has a past medical history of MS (multiple sclerosis) (formerly [facility]).      He has a current medication list which includes the following prescription(s): [facility] d[facility], cyclobenzaprine, diclofenac, dimethyl fumarate, ergocalciferol, magnesium oxide, modafinil, nicotine, nicotine, and NON FORMULARY.      HPI: He is here for wife's complaints of loud snoring and witnessed apneas of several years duration.  Elvis is un aware of snoring or breathing difficulties during sleep but awakens feeling unrefreshed.  Other complaints: He is tired irrespective of using Provigil. Restless Leg Syndrome: reports no suggestive symptoms but awakens with leg cramps..    Parasomnia: no features reported    Sleep Routine (averaged): Typical Bedtime: 10 PM.  Gets OOB: 6 AM. TIB:8 hrs.   Sleep latency:< 15 minutes; Sleep Interruptions: 2-5, because of nocturia, not always sure of the cause, and at times struggles to fall back asleep. Awakens: Needing an alarm  Upon awakening: rarely feels refreshed .[He estimates getting hrs sleep.] Daytime Function:Elvis reports excessive daytime sleepiness feels like napping but avoids but may doze off when sedentary.. He rated himself at Total score: 13 /24 on the [place] Sleepiness Scale.     Habits:   reports that he has been smoking cigarettes. He has never used smokeless tobacco.;  reports that he does not currently use alcohol.; Reports that he does not currently use drugs after having used the following drugs: Marijuana.;  E-Cigarette/Vaping    E-Cigarette Use  Never User; Caffeine use:moderate; Exercise  "routine: none.    Occupation:     Family History: Negative for sleep disturbance.  ROS: Significant for weight has been stable.  He reports nasal stuffiness and some cough.  He reported no other respiratory or cardiac symptoms.  He has history of spinal injury for which he is required surgery and now has radicular symptoms...     EXAM:  /90 (BP Location: Left arm, Patient Position: Sitting, Cuff Size: Standard)   Pulse 100   Ht 5' 8\" (1.727 m)   Wt 78.5 kg (173 lb)   SpO2 95%   BMI 26.30 kg/m²    General: Well groomed male, well appearing, in no apparent distress.   Neurological: Alert and orientated; cooperative; Cranial nerves intact;    Psychiatric: Speech: Clear and coherent; normal mood, affect & thought   Skin: Warm and dry; Color& Hydration good; no facial rashes or lesions   HEENT:  Craniofacial anatomy: normal Sinuses: Non-tender. TMJ: Normal    Eyes: EOM's intact; conjunctiva/corneas clear   Ears: Appear normal     Nasal Airway: narrow nares and assymetric nares Septum: Intact; Turbinates: Normal; Rhinorrhea: None  Mouth: Lips: Normal posture; Dentition: missing several and irregular. Mucosa: Moist; Hard Palate:normal    Oropharryx: crowdedTongue: Mallampati:Class III and MobileSoft Palate:  redundant  and Uvular Hypertrophy Tonsils: absent  Neck:; neck Circumference: 14.5 \"; supple; no abnormal masses; Thyroid: Normal. Trachea: Central.    Lymph: No cervical or submandibular Lymhadenopathy  Heart: S1,S2 normal; RRR; no gallop; no murmur   Lungs: Respiratory Effort: Normal. Air entry good bilaterally.  No wheezes.  No rales  Abdomen: Obese, soft & non-tender    Extremities: No pedal edema.  No clubbing or cyanosis.    Musculoskeletal:  Motor normal; Gait: Normal.       IMPRESSION: Primary/Secondary Sleep Diagnoses (to Medical or Psych conditions) & Comorbidities   1. Snoring  Ambulatory Referral to Sleep Medicine    Diagnostic Sleep Study    CPAP Study      2. Witnessed episode of apnea  " "Diagnostic Sleep Study    CPAP Study      3. Excessive daytime sleepiness  Ambulatory Referral to Sleep Medicine    Diagnostic Sleep Study    CPAP Study      4. Nocturnal leg cramps        5. Multiple sclerosis (HCC)        6. Medical marijuana use        7. Cigarette smoker        8. BMI 26.0-26.9,adult             PLAN:   1. With respect to above conditions, comprehensive counseling provided on pathophysiology; effects on symptoms and comorbidities, diagnostic strategies & limitations; treatment options; risks or no treament; risks & benefits of the various therapeutic options; costs and insurance aspects.     2. I advised weight reduction, avoiding sleeping supine, using alcohol or sedating medications close to bed time and on safe driving practices.    3. Sleep testing is indicated and a diagnostic study will be scheduled.   4.  Patient is willing to try Positive airway pressure therapy and will be scheduled for a titration study if indicated.  5.  I advised smoking cessation and he is contemplating.  6. Follow-up to be scheduled after testing initiation of therapy to review results, further details of treatment options and to adjust therapy.    Sincerely,      Authenticated electronically on 03/12/24   Board Certified Specialist     Portions of the record may have been created with voice recognition software. Occasional wrong word or \"sound a like\" substitutions may have occurred due to the inherent limitations of voice recognition software. There may also be notations and random deletions of words or characters from malfunctioning software. Read the chart carefully and recognize, using context, where substitutions/deletions have occurred.     "

## 2024-03-12 NOTE — PATIENT INSTRUCTIONS
What is BROOKE?   Obstructive sleep apnea is a common and serious sleep disorder that causes you to stop breathing during sleep. The airway repeatedly becomes blocked, limiting the amount of air that reaches your lungs. When this happens, you may snore loudly or making choking noises as you try to breathe. Your brain and body becomes oxygen deprived and you may wake up. This may happen a few times a night, or in more severe cases, several hundred times a night.   Sleep apnea can make you wake up in the morning feeling tired or unrefreshed even though you have had a full night of sleep. During the day, you may feel fatigued, have difficulty concentrating or you may even unintentionally fall asleep. This is because your body is waking up numerous times throughout the night, even though you might not be conscious of each awakening.  The lack of oxygen your body receives can have negative long-term consequences for your health. This includes:  High blood pressure  Heart disease  Irregular heart rhythms  Stroke  Pre-diabetes and diabetes  Depression  Testing  An objective evaluation of your sleep may be needed before your board certified sleep physician can make a diagnosis. Options include:   In-lab overnight sleep study  This type of sleep study requires you to stay overnight at a sleep center, in a bed that may resemble a hotel room. You will sleep with sensors hooked up to various parts of your body. These sensors record your brain waves, heartbeat, breathing and movement. An overnight sleep study also provides your doctor with the most complete information about your sleep. Learn more about an overnight sleep study.   Home sleep apnea test  Some patients with high risk factors for obstructive sleep apnea and no other medical disorders may be candidates for a home sleep apnea test. The testing equipment differs in that it is less complicated than what is used in an overnight sleep study. As such, does not give all the  data an in-lab will and if negative, may not mean you do not have the problem.  Treatment for sleep apnea includes using a continuous positive airway pressure (CPAP) machine to keep your airway open during sleep. A mask is placed over your nose and mouth, or just your nose. The mask is hooked to the CPAP machine that blows a gentle stream of air into the mask when you breathe. This helps keep your airway open so you can breathe more regularly. Extra oxygen may be given to you through the machine. You may be given a mouth device. It looks like a mouth guard or dental retainer and stops your tongue and mouth tissues from blocking your throat while you sleep. Surgery may be needed to remove extra tissues that block your mouth, throat, or nose.  Manage sleep apnea:   Do not smoke. Nicotine and other chemicals in cigarettes and cigars can cause lung damage. Ask your healthcare provider for information if you currently smoke and need help to quit. E-cigarettes or smokeless tobacco still contain nicotine. Talk to your healthcare provider before you use these products.  Do not drink alcohol or take sedative medicine before you go to sleep. Alcohol and sedatives can relax the muscles and tissues around your throat. This can block the airflow to your lungs.  Maintain a healthy weight. Excess tissue around your throat may restrict your breathing. Ask your healthcare provider for information if you need to lose weight.  Sleep on your side or use pillows designed to prevent sleep apnea. This prevents your tongue or other tissues from blocking your throat. You can also raise the head of your bed.  Driving Safety. Refrain from driving when drowsy.   Follow up with your healthcare provider as directed: Write down your questions so you remember to ask them during your visits.  Go to AASM website for more information: Sleepeducation.org  What is BROOKE?   Obstructive sleep apnea is a common and serious sleep disorder that causes you to  stop breathing during sleep. The airway repeatedly becomes blocked, limiting the amount of air that reaches your lungs. When this happens, you may snore loudly or making choking noises as you try to breathe. Your brain and body becomes oxygen deprived and you may wake up. This may happen a few times a night, or in more severe cases, several hundred times a night.   Sleep apnea can make you wake up in the morning feeling tired or unrefreshed even though you have had a full night of sleep. During the day, you may feel fatigued, have difficulty concentrating or you may even unintentionally fall asleep. This is because your body is waking up numerous times throughout the night, even though you might not be conscious of each awakening.  The lack of oxygen your body receives can have negative long-term consequences for your health. This includes:  High blood pressure  Heart disease  Irregular heart rhythms  Stroke  Pre-diabetes and diabetes  Depression  Testing  An objective evaluation of your sleep may be needed before your board certified sleep physician can make a diagnosis. Options include:   In-lab overnight sleep study  This type of sleep study requires you to stay overnight at a sleep center, in a bed that may resemble a hotel room. You will sleep with sensors hooked up to various parts of your body. These sensors record your brain waves, heartbeat, breathing and movement. An overnight sleep study also provides your doctor with the most complete information about your sleep. Learn more about an overnight sleep study.   Home sleep apnea test  Some patients with high risk factors for obstructive sleep apnea and no other medical disorders may be candidates for a home sleep apnea test. The testing equipment differs in that it is less complicated than what is used in an overnight sleep study. As such, does not give all the data an in-lab will and if negative, may not mean you do not have the problem.  Treatment for  sleep apnea includes using a continuous positive airway pressure (CPAP) machine to keep your airway open during sleep. A mask is placed over your nose and mouth, or just your nose. The mask is hooked to the CPAP machine that blows a gentle stream of air into the mask when you breathe. This helps keep your airway open so you can breathe more regularly. Extra oxygen may be given to you through the machine. You may be given a mouth device. It looks like a mouth guard or dental retainer and stops your tongue and mouth tissues from blocking your throat while you sleep. Surgery may be needed to remove extra tissues that block your mouth, throat, or nose.  Manage sleep apnea:   Do not smoke. Nicotine and other chemicals in cigarettes and cigars can cause lung damage. Ask your healthcare provider for information if you currently smoke and need help to quit. E-cigarettes or smokeless tobacco still contain nicotine. Talk to your healthcare provider before you use these products.  Do not drink alcohol or take sedative medicine before you go to sleep. Alcohol and sedatives can relax the muscles and tissues around your throat. This can block the airflow to your lungs.  Maintain a healthy weight. Excess tissue around your throat may restrict your breathing. Ask your healthcare provider for information if you need to lose weight.  Sleep on your side or use pillows designed to prevent sleep apnea. This prevents your tongue or other tissues from blocking your throat. You can also raise the head of your bed.  Driving Safety. Refrain from driving when drowsy.   Follow up with your healthcare provider as directed: Write down your questions so you remember to ask them during your visits.  Go to AASM website for more information: Sleepeducation.org    Nursing Support:  When: Monday through Friday 7A-5PM except holidays  Where: Our direct line is [phone].    If you are having a true emergency please call 911.  In the event that the  line is busy or it is after hours please leave a voice message and we will return your call.  Please speak clearly, leaving your full name, birth date, best number to reach you and the reason for your call.   Medication refills: We will need the name of the medication, the dosage, the ordering provider, whether you get a 30 or 90 day refill, and the pharmacy name and address.  Medications will be ordered by the provider only.  Nurses cannot call in prescriptions.  Please allow 7 days for medication refills.  Physician requested updates: If your provider requested that you call with an update after starting medication, please be ready to provide us the medication and dosage, what time you take your medication, the time you attempt to fall asleep, time you fall asleep, when you wake up, and what time you get out of bed.  Sleep Study Results: We will contact you with sleep study results and/or next steps after the physician has reviewed your testing.

## 2024-03-21 ENCOUNTER — TELEPHONE (OUTPATIENT)
Dept: NEUROLOGY | Facility: CLINIC | Age: 44
End: 2024-03-21

## 2024-03-21 NOTE — TELEPHONE ENCOUNTER
Called and spoke to patient. Patient confirmed upcoming apt with Gaby Larsen PA-C on 3/26/24 @ 3:30 in the [place] Office.

## 2024-03-26 ENCOUNTER — OFFICE VISIT (OUTPATIENT)
Dept: NEUROLOGY | Facility: CLINIC | Age: 44
End: 2024-03-26
Payer: COMMERCIAL

## 2024-03-26 VITALS
BODY MASS INDEX: 25.76 KG/M2 | TEMPERATURE: 97.7 F | HEART RATE: 102 BPM | HEIGHT: 68 IN | WEIGHT: 170 LBS | DIASTOLIC BLOOD PRESSURE: 80 MMHG | RESPIRATION RATE: 18 BRPM | OXYGEN SATURATION: 95 % | SYSTOLIC BLOOD PRESSURE: 128 MMHG

## 2024-03-26 DIAGNOSIS — G35 MULTIPLE SCLEROSIS (HCC): Primary | ICD-10-CM

## 2024-03-26 DIAGNOSIS — M62.838 MUSCLE SPASMS OF BOTH LOWER EXTREMITIES: ICD-10-CM

## 2024-03-26 DIAGNOSIS — G47.19 EXCESSIVE DAYTIME SLEEPINESS: ICD-10-CM

## 2024-03-26 DIAGNOSIS — E55.9 VITAMIN D DEFICIENCY: ICD-10-CM

## 2024-03-26 PROCEDURE — 99214 OFFICE O/P EST MOD 30 MIN: CPT | Performed by: PHYSICIAN ASSISTANT

## 2024-03-26 NOTE — PATIENT INSTRUCTIONS
Continue Tecfidera.  Continue getting labs done every other month with standing order (due next in early May 2024).  I am also rechecking vit D with next set of labs  Continue modafinil and proceed with sleep study as ordered  Continue flexeril, maintain adequate hydration  Continue yoga and routine exercise   Return in 6 months

## 2024-03-26 NOTE — PROGRESS NOTES
Patient ID: Elvis Hart is a 43 y.o. male.    Assessment/Plan:    Multiple sclerosis (HCC)  Patient remains clinically stable from a MS standpoint.  He continues on dimethyl fumarate, tolerating well.    Labs were completed on 1/23/24, ALC 0.7. Normal LFTs.  I had him repeat CBC more recently on 3/6/24 and ALC 0.9.  -- Will have him continue getting blood work done every other month with a standing lab order.  I entered a new standing order today.    MRI brain last updated 9/23/23 and stable compared to 2/17/22.    He recently started doing yoga due to a low back injury and finds it enjoyable and helpful for his lower back pain.  I encouraged him to continue yoga, as they can help with his MS symptoms as well including spasticity, poor balance.    He continues on modafinil for fatigue.  He also recently had a consult with sleep medicine and there is some concern for BROOKE and will be having a sleep study.    Neurologic exam is stable today.    Muscle spasms of both lower extremities  Continues on Flexeril 10 mg at bedtime, which is helpful.  I also encouraged him to continue the yoga, which will likely be helpful.    Vitamin D deficiency  Currently taking OTC supplement, which is actually prescribed by his PCP, but it is an OTC dose.  Will recheck a level at this time.    Patient will return in 6 months or sooner if needed     Diagnoses and all orders for this visit:    Multiple sclerosis (HCC)  -     CBC and differential; Standing  -     Hepatic function panel; Standing  -     CBC and differential  -     Hepatic function panel  -     modafinil (PROVIGIL) 200 MG tablet; Take 1.5 tablets (300 mg total) by mouth daily    Vitamin D deficiency  -     Vitamin D 25 hydroxy; Future  -     Vitamin D 25 hydroxy    Excessive daytime sleepiness  -     modafinil (PROVIGIL) 200 MG tablet; Take 1.5 tablets (300 mg total) by mouth daily    Muscle spasms of both lower extremities           Subjective:    HPI    Elvis Hart is a  43-year-old male who presents today for follow up.  He was last seen in September 2023.     Patient had initially presented to [facility] on 8/23/20 with generalized weakness x 2 days. Prior to admission his left side appeared weaker than the right, he was falling to the left with ambulation, and had mild speech difficulties.  CT head showed a white matter hypodensity in the left frontal region which could represent area of demyelination or prior infarct.  No acute changes.  CTA head and neck with no occlusive disease.  Neurology was consulted.  On exam he was found found to have left upper and lower extremity ataxia.  MRI brain demonstrated multiple ovoid bilateral subcortical and periventricular T2 signal lesions oriented perpendicular to the ventricular system. Single similar T2 lesion oriented in the right anterior inferior lateral thalamus. Repeat study with contrast did not demonstrate any enhancement.  MRI C-spine demonstrated T2 lesions compatible with a history of MS in the left lateral cord at C2-C3, mid cord at C3-C4 small right anterior lesion at C4. There is no abnormal enhancement.  MRI T-spine demonstrated a healed T7 vertebral body fracture with hardware. There is no clear T2 signal abnormalities noted within the thoracic cord. There is no abnormal enhancement.  LP was performed.  Basic CSF studies were unremarkable including glucose, protein, rbc's, wbc's.  MS panel was positive with >5 oligoclonal bands not present in the serum, elevated IgG synthesis rate, normal MBP.  He completed 5 days of high-dose Solu-Medrol 1 g daily.  He was told he met the criteria for MS diagnosis.  At time of discharge, the plan was for him to follow with [facility]. He did not follow-up with [facility] due to insurance reasons.     Patient then presented to [facility] on 12/05/2020 complaining of painful pressure and dysthesias in the right distal upper extremity exacerbated with use.  Neurology was  consulted.  It was suspected that he had a peripheral nerve issue such as carpal tunnel syndrome.  It was recommended that he obtain MRI brain outpatient (it was not available at the time he was admitted), and EMG of the right upper extremity.     MRI brain with and without contrast was completed on 12/15/2020 and demonstrated a moderate degree of demyelinating disease with chronic appearing lesions.  There was 1 enhancing subcentimeter lesion in the paramedian anterior left frontal lobe.  EMG of the right upper extremity was completed on 12/11/2020 and demonstrated a mild carpal tunnel syndrome.     Patient reported a family history of MS in his mother, who he has no contact with.  He is not sure what her course has been like.  Patient reported that over the years he has had multiple symptoms that could have been concerning for a neurologic disorder including sensory and motor dysfunction of different limbs at different times, some increased frequency and urgency of urination, some falls.  He reported he had heavy drug use and probably ignored most of it or did not feel most of it to the extent he would have if he was not high.  He no longer uses drugs.     At timing of his hospital follow up visit we discussed disease modifying therapy.  He was adamant about no injectable meds given prior drug use and having needles in the house would be a “trigger” for him.  We discussed oral meds.   He is JCV positive with index 1.48.  He was most interested in Aubagio.  Aubagio was approved, but patient then became reluctant to take the medication.  He was specifically worried about the potential side effect of peripheral neuropathy.  He then said he did not want to take a pill every day and inquired about injectable meds although he initially did not want to take them.  He was started on Copaxone.        MRI brain was completed 06/02/2021 for surveillance and for a new baseline, and this demonstrated for new foci of  "demyelination, 2 of which demonstrated enhancement.  He denied any new, focal symptoms but was given 3 days of IV steroids in mid June 2021.  Tolerated the steroids well.     Patient started Copaxone in early June 2021.  He initially had some local injection site redness, otherwise did ok.  On 7/3/21 he called the office over the weekend reporting a more severe reaction with his injection.   He injected on 07/02 into the abdomen.  Soon after he had swelling and redness at the injection site, which by the next day was \"baseball sized\", as well as he had hives over his arms abdomen and itching in his genital area.  He took Benadryl the night after his injection and did notice a little bit of improvement the next day.  He was advised to go to urgent care/ ER.  He went to urgent care the next morning and was prescribed an oral prednisone taper.  He was advised to discontinue his glatiramer acetate.  At timing of 7/9/21 visit, decision was made to start dimethyl fumarate.      He started dimethyl fumarate in early August 2021.  MRI brain was updated 2/17/22 and stable compared to 6/2/21, no evidence of disease progression.     He has complained of allodynia and dysthesias in the RUE as well as paresthesias in other parts of his body, but RUE is the worst. He was tried on gabapentin and then switched to Lyrica, neither or which provided any relief. He says he has learned to deal with this sensation over time.  He has been taking Flexeril for spasms.     Today, patient reports he has been about the same since his last visit. He denies any new, focal neurologic symptoms. He continues on dimethyl fumarate with no issues. He continues on modafinil for his fatigue. He was referred to sleep medicine by Dr. HIRSCH and saw Dr. Molina recently, will be getting a sleep study.  Labs were completed on 1/23/24, ALC 0.7. Normal LFTs.  I had him repeat CBC more recently on 3/6/24 and ALC 0.9.  MRI brain last updated 9/23/23 and stable " "compared to 2/17/22.  He reports he just started doing yoga.  He had injured his low back and did some PT, which he did not find very helpful, so tried yoga, which helped more, and he found he really enjoys this.    The following portions of the patient's history were reviewed and updated as appropriate: current medications, past family history, past medical history, past social history, past surgical history, and problem list.         Objective:    Blood pressure 128/80, pulse 102, temperature 97.7 °F (36.5 °C), temperature source Temporal, resp. rate 18, height 5' 8\" (1.727 m), weight 77.1 kg (170 lb), SpO2 95%.    Physical Exam  Constitutional:       Appearance: Normal appearance.   Eyes:      Extraocular Movements: EOM normal.      Pupils: Pupils are equal, round, and reactive to light.   Neurological:      Mental Status: He is alert.      Motor: Motor strength is normal.     Deep Tendon Reflexes: Reflexes are normal and symmetric.   Psychiatric:         Mood and Affect: Mood normal.         Speech: Speech normal.         Behavior: Behavior normal.         Neurological Exam  Mental Status  Alert. Oriented to person, place, time and situation. Speech is normal. Language is fluent with no aphasia. Attention and concentration are normal.    Cranial Nerves  CN II: Visual fields full to confrontation.  CN III, IV, VI: Extraocular movements intact bilaterally. Pupils equal round and reactive to light bilaterally.  CN V: Facial sensation is normal.  CN VII: Full and symmetric facial movement.  CN VIII: Hearing is normal.  CN IX, X: Palate elevates symmetrically  CN XI: Shoulder shrug strength is normal.  CN XII: Tongue midline without atrophy or fasciculations.    Motor   Normal muscle tone. Strength is 5/5 throughout all four extremities.    Sensory  Light touch is normal in upper and lower extremities.     Reflexes  Deep tendon reflexes are 2+ and symmetric in all four extremities.    Coordination  Right: " Finger-to-nose normal.Left: Finger-to-nose normal.    Gait  Casual gait is normal including stance, stride, and arm swing.        ROS:    Review of Systems   Constitutional: Negative.    HENT: Negative.     Eyes: Negative.    Respiratory: Negative.     Cardiovascular: Negative.    Gastrointestinal: Negative.    Endocrine: Negative.    Genitourinary: Negative.    Musculoskeletal:  Positive for gait problem (balance is off).   Skin: Negative.    Allergic/Immunologic: Negative.    Hematological: Negative.    Psychiatric/Behavioral: Negative.       I personally reviewed and updated the ROS as appropriate

## 2024-03-27 RX ORDER — MODAFINIL 200 MG/1
300 TABLET ORAL DAILY
Qty: 45 TABLET | Refills: 5 | Status: SHIPPED | OUTPATIENT
Start: 2024-03-27

## 2024-03-27 NOTE — ASSESSMENT & PLAN NOTE
Currently taking OTC supplement, which is actually prescribed by his PCP, but it is an OTC dose.  Will recheck a level at this time.

## 2024-03-27 NOTE — ASSESSMENT & PLAN NOTE
Patient remains clinically stable from a MS standpoint.  He continues on dimethyl fumarate, tolerating well.    Labs were completed on 1/23/24, ALC 0.7. Normal LFTs.  I had him repeat CBC more recently on 3/6/24 and ALC 0.9.  -- Will have him continue getting blood work done every other month with a standing lab order.  I entered a new standing order today.    MRI brain last updated 9/23/23 and stable compared to 2/17/22.    He recently started doing yoga due to a low back injury and finds it enjoyable and helpful for his lower back pain.  I encouraged him to continue yoga, as they can help with his MS symptoms as well including spasticity, poor balance.    He continues on modafinil for fatigue.  He also recently had a consult with sleep medicine and there is some concern for BROOKE and will be having a sleep study.    Neurologic exam is stable today.

## 2024-03-27 NOTE — ASSESSMENT & PLAN NOTE
Continues on Flexeril 10 mg at bedtime, which is helpful.  I also encouraged him to continue the yoga, which will likely be helpful.

## 2024-06-07 ENCOUNTER — HOSPITAL ENCOUNTER (OUTPATIENT)
Facility: HOSPITAL | Age: 44
Discharge: HOME | End: 2024-06-07
Attending: INTERNAL MEDICINE
Payer: COMMERCIAL

## 2024-06-07 DIAGNOSIS — G47.19 EXCESSIVE DAYTIME SLEEPINESS: ICD-10-CM

## 2024-06-07 DIAGNOSIS — R06.81 WITNESSED EPISODE OF APNEA: ICD-10-CM

## 2024-06-07 DIAGNOSIS — R06.83 SNORING: ICD-10-CM

## 2024-06-07 PROCEDURE — 95810 POLYSOM 6/> YRS 4/> PARAM: CPT

## 2024-06-08 PROBLEM — R06.83 SNORING: Status: ACTIVE | Noted: 2024-06-08

## 2024-06-08 PROBLEM — G47.19 EXCESSIVE DAYTIME SLEEPINESS: Status: ACTIVE | Noted: 2024-06-08

## 2024-06-08 PROBLEM — R06.81 WITNESSED EPISODE OF APNEA: Status: ACTIVE | Noted: 2024-06-08

## 2024-06-08 NOTE — PROGRESS NOTES
Sleep Study Documentation    Pre-Sleep Study       Sleep testing procedure explained to patient:YES    Patient napped prior to study:NO    Caffeine:Dayshift worker after 12PM.  Caffeine use:Yes, energy drinks 1 serving    Alcohol:Dayshift workers after 5PM: Alcohol use:NO    Typical day for patient:YES       Study Documentation    Sleep Study Indications: Snoring, EDS, and witnessed apneas    Sleep Study: Diagnostic   Snore:Moderate  Supplemental O2: no    O2 flow rate (L/min) range N/A  O2 flow rate (L/min) final N/A  Minimum SaO2 83%  Baseline SaO2 91%    EKG abnormalities: no     EEG abnormalities: no    Were abnormal behaviors in sleep observed:NO    Is Total Sleep Study Recording Time < 2 hours: N/A    Is Total Sleep Study Recording Time > 2 hours but study is incomplete: N/A    Is Total Sleep Study Recording Time 6 hours or more but sleep was not obtained: NO    Patient classification: employed       Post-Sleep Study    Medication used at bedtime or during sleep study:YES other prescription medications    Patient reports time it took to fall asleep:30 to 60 minutes    Patient reports waking up during study:1 to 2 times.  Patient reports returning to sleep in 10 to 30 minutes.    Patient reports sleeping 4 to 6 hours without dreaming.    Does the Patient feel this is a typical night of sleep:worse than usual    Patient rated sleepiness: Somewhat sleepy or tired    PAP treatment:no.

## 2024-06-10 DIAGNOSIS — G47.33 OSA (OBSTRUCTIVE SLEEP APNEA): Primary | ICD-10-CM

## 2024-06-12 ENCOUNTER — TELEPHONE (OUTPATIENT)
Dept: SLEEP CENTER | Facility: CLINIC | Age: 44
End: 2024-06-12

## 2024-06-12 NOTE — TELEPHONE ENCOUNTER
Sleep study resulted and shows mild sleep apnea with mild periodic limb movement. CPAP ordered.     Noted patient has a CPAP titration study scheduled for 10/2/2024.  Spoke with Dr. Molina and he said to cancel that test.     Call to patient, reviewed sleep study results and recommendation.     Patient busy at work and unable to finish conversation with him.  Instructed will send a [place] message with results and next steps. Patient encouraged to call back for questions or concerns.

## 2024-07-16 LAB
25(OH)D3+25(OH)D2 SERPL-MCNC: 55.9 NG/ML (ref 30–100)
ALBUMIN SERPL-MCNC: 4.4 G/DL (ref 4.1–5.1)
ALP SERPL-CCNC: 57 IU/L (ref 44–121)
ALT SERPL-CCNC: 33 IU/L (ref 0–44)
AST SERPL-CCNC: 29 IU/L (ref 0–40)
BASOPHILS # BLD AUTO: 0 X10E3/UL (ref 0–0.2)
BASOPHILS NFR BLD AUTO: 1 %
BILIRUB DIRECT SERPL-MCNC: 0.1 MG/DL (ref 0–0.4)
BILIRUB SERPL-MCNC: 0.3 MG/DL (ref 0–1.2)
EOSINOPHIL # BLD AUTO: 0.3 X10E3/UL (ref 0–0.4)
EOSINOPHIL NFR BLD AUTO: 4 %
ERYTHROCYTE [DISTWIDTH] IN BLOOD BY AUTOMATED COUNT: 12.5 % (ref 11.6–15.4)
HCT VFR BLD AUTO: 41 % (ref 37.5–51)
HGB BLD-MCNC: 13.5 G/DL (ref 13–17.7)
IMM GRANULOCYTES # BLD: 0 X10E3/UL (ref 0–0.1)
IMM GRANULOCYTES NFR BLD: 0 %
LYMPHOCYTES # BLD AUTO: 0.9 X10E3/UL (ref 0.7–3.1)
LYMPHOCYTES NFR BLD AUTO: 14 %
MCH RBC QN AUTO: 31.5 PG (ref 26.6–33)
MCHC RBC AUTO-ENTMCNC: 32.9 G/DL (ref 31.5–35.7)
MCV RBC AUTO: 96 FL (ref 79–97)
MONOCYTES # BLD AUTO: 0.6 X10E3/UL (ref 0.1–0.9)
MONOCYTES NFR BLD AUTO: 9 %
NEUTROPHILS # BLD AUTO: 4.4 X10E3/UL (ref 1.4–7)
NEUTROPHILS NFR BLD AUTO: 72 %
PLATELET # BLD AUTO: 318 X10E3/UL (ref 150–450)
PROT SERPL-MCNC: 6.7 G/DL (ref 6–8.5)
RBC # BLD AUTO: 4.28 X10E6/UL (ref 4.14–5.8)
WBC # BLD AUTO: 6.2 X10E3/UL (ref 3.4–10.8)

## 2024-08-14 DIAGNOSIS — G35 MULTIPLE SCLEROSIS (HCC): ICD-10-CM

## 2024-08-15 DIAGNOSIS — R51.9 HEADACHE: ICD-10-CM

## 2024-08-15 RX ORDER — LANOLIN ALCOHOL/MO/W.PET/CERES
CREAM (GRAM) TOPICAL
Qty: 60 TABLET | Refills: 3 | Status: SHIPPED | OUTPATIENT
Start: 2024-08-15

## 2024-08-15 RX ORDER — DIMETHYL FUMARATE 240 MG/1
CAPSULE ORAL
Qty: 60 CAPSULE | Refills: 5 | Status: SHIPPED | OUTPATIENT
Start: 2024-08-15

## 2024-09-30 ENCOUNTER — OFFICE VISIT (OUTPATIENT)
Dept: NEUROLOGY | Facility: CLINIC | Age: 44
End: 2024-09-30
Payer: COMMERCIAL

## 2024-09-30 VITALS
DIASTOLIC BLOOD PRESSURE: 76 MMHG | SYSTOLIC BLOOD PRESSURE: 114 MMHG | BODY MASS INDEX: 25.06 KG/M2 | TEMPERATURE: 98.1 F | WEIGHT: 164.8 LBS | HEART RATE: 105 BPM | OXYGEN SATURATION: 100 %

## 2024-09-30 DIAGNOSIS — G47.33 OSA (OBSTRUCTIVE SLEEP APNEA): ICD-10-CM

## 2024-09-30 DIAGNOSIS — M62.838 MUSCLE SPASMS OF BOTH LOWER EXTREMITIES: ICD-10-CM

## 2024-09-30 DIAGNOSIS — G35 MULTIPLE SCLEROSIS (HCC): Primary | ICD-10-CM

## 2024-09-30 PROBLEM — G44.209 TENSION HEADACHE: Status: RESOLVED | Noted: 2021-12-17 | Resolved: 2024-09-30

## 2024-09-30 PROCEDURE — 99214 OFFICE O/P EST MOD 30 MIN: CPT | Performed by: PHYSICIAN ASSISTANT

## 2024-09-30 RX ORDER — MODAFINIL 200 MG/1
TABLET ORAL
Qty: 60 TABLET | Refills: 5 | Status: SHIPPED | OUTPATIENT
Start: 2024-09-30

## 2024-09-30 NOTE — ASSESSMENT & PLAN NOTE
Recent sleep study with mild BROOKE, PAP therapy was recommended.  He has not started this yet, is unsure he will tolerate it.  Encouraged him to try, or make an appt with Dr. Molina to discuss other options.  Discussed it is likely contributing to his fatigue, and discussed other negative health implications of untreated sleep apnea.

## 2024-09-30 NOTE — ASSESSMENT & PLAN NOTE
Patient remains clinically stable from a MS standpoint.  He continues on dimethyl fumarate, tolerating well.    Labs were completed on 9/13/24, ALC 1.1. Normal LFTs.    -- Will have him continue getting blood work done every other month with a standing lab order.  I entered a new standing order today.    MRI brain last updated 9/23/23 and stable compared to 2/17/22. Consider repeating his imaging in early 2025.     He continues to have paresthesias, numbness, nelson in the RLE.  No painful symptoms.  Has tried gabapentin and Lyrica in the past, did not find them helpful.  Consider TCAs, duloxetine.  He does not feel he needs a med right now.  Encouraged him to continue routine exercise, stretching, yoga.     He continues on modafinil for fatigue.  He takes 1-2 tabs daily.  He also recently had sleep study which showed mild BROOKE, PAP therapy was recommended.  He did not start CPAP yet.      Neurologic exam is stable today.

## 2024-09-30 NOTE — PATIENT INSTRUCTIONS
Continue dimethyl fumarate  Please continue getting labs done every other month  Would suggest calling the sleep center and making an appt with Dr. Molina to discuss other options for treatment of sleep apnea if you don't want the CPAP machine  Continue other medications the same  Continue yoga, exercise and stretching  Return in 6 months

## 2024-09-30 NOTE — ASSESSMENT & PLAN NOTE
Continues on Flexeril 10 mg at bedtime, which is helpful.  I also encouraged him to continue the yoga, which has been helpful.

## 2024-09-30 NOTE — PROGRESS NOTES
Patient ID: Elvis Hart is a 43 y.o. male.    Assessment/Plan:    Multiple sclerosis (HCC)  Patient remains clinically stable from a MS standpoint.  He continues on dimethyl fumarate, tolerating well.    Labs were completed on 9/13/24, ALC 1.1. Normal LFTs.    -- Will have him continue getting blood work done every other month with a standing lab order.  I entered a new standing order today.    MRI brain last updated 9/23/23 and stable compared to 2/17/22. Consider repeating his imaging in early 2025.     He continues to have paresthesias, numbness, nelson in the RLE.  No painful symptoms.  Has tried gabapentin and Lyrica in the past, did not find them helpful.  Consider TCAs, duloxetine.  He does not feel he needs a med right now.  Encouraged him to continue routine exercise, stretching, yoga.     He continues on modafinil for fatigue.  He takes 1-2 tabs daily.  He also recently had sleep study which showed mild BROOKE, PAP therapy was recommended.  He did not start CPAP yet.      Neurologic exam is stable today.    BROOKE (obstructive sleep apnea)  Recent sleep study with mild BROOKE, PAP therapy was recommended.  He has not started this yet, is unsure he will tolerate it.  Encouraged him to try, or make an appt with Dr. Molina to discuss other options.  Discussed it is likely contributing to his fatigue, and discussed other negative health implications of untreated sleep apnea.     Muscle spasms of both lower extremities  Continues on Flexeril 10 mg at bedtime, which is helpful.  I also encouraged him to continue the yoga, which has been helpful.    Return in 6 months or sooner if needed     Diagnoses and all orders for this visit:    Multiple sclerosis (HCC)  -     CBC and differential; Standing  -     Hepatic function panel; Standing  -     CBC and differential  -     Hepatic function panel  -     modafinil (PROVIGIL) 200 MG tablet; Take 1-2 tabs daily    Muscle spasms of both lower extremities    BROOKE (obstructive sleep  apnea)           Subjective:    HPI    Elvis Hart is a 43-year-old male who presents today for follow up.  He was last seen in March 2024.     Patient had initially presented to [facility] on 8/23/20 with generalized weakness x 2 days. Prior to admission his left side appeared weaker than the right, he was falling to the left with ambulation, and had mild speech difficulties.  CT head showed a white matter hypodensity in the left frontal region which could represent area of demyelination or prior infarct.  No acute changes.  CTA head and neck with no occlusive disease.  Neurology was consulted.  On exam he was found found to have left upper and lower extremity ataxia.  MRI brain demonstrated multiple ovoid bilateral subcortical and periventricular T2 signal lesions oriented perpendicular to the ventricular system. Single similar T2 lesion oriented in the right anterior inferior lateral thalamus. Repeat study with contrast did not demonstrate any enhancement.  MRI C-spine demonstrated T2 lesions compatible with a history of MS in the left lateral cord at C2-C3, mid cord at C3-C4 small right anterior lesion at C4. There is no abnormal enhancement.  MRI T-spine demonstrated a healed T7 vertebral body fracture with hardware. There is no clear T2 signal abnormalities noted within the thoracic cord. There is no abnormal enhancement.  LP was performed.  Basic CSF studies were unremarkable including glucose, protein, rbc's, wbc's.  MS panel was positive with >5 oligoclonal bands not present in the serum, elevated IgG synthesis rate, normal MBP.  He completed 5 days of high-dose Solu-Medrol 1 g daily.  He was told he met the criteria for MS diagnosis.  At time of discharge, the plan was for him to follow with [facility]. He did not follow-up with [facility] due to insurance reasons.     Patient then presented to [facility] on 12/05/2020 complaining of painful pressure and dysthesias in the right distal  upper extremity exacerbated with use.  Neurology was consulted.  It was suspected that he had a peripheral nerve issue such as carpal tunnel syndrome.  It was recommended that he obtain MRI brain outpatient (it was not available at the time he was admitted), and EMG of the right upper extremity.     MRI brain with and without contrast was completed on 12/15/2020 and demonstrated a moderate degree of demyelinating disease with chronic appearing lesions.  There was 1 enhancing subcentimeter lesion in the paramedian anterior left frontal lobe.  EMG of the right upper extremity was completed on 12/11/2020 and demonstrated a mild carpal tunnel syndrome.     Patient reported a family history of MS in his mother, who he has no contact with.  He is not sure what her course has been like.  Patient reported that over the years he has had multiple symptoms that could have been concerning for a neurologic disorder including sensory and motor dysfunction of different limbs at different times, some increased frequency and urgency of urination, some falls.  He reported he had heavy drug use and probably ignored most of it or did not feel most of it to the extent he would have if he was not high.  He no longer uses drugs.     At timing of his hospital follow up visit we discussed disease modifying therapy.  He was adamant about no injectable meds given prior drug use and having needles in the house would be a “trigger” for him.  We discussed oral meds.   He is JCV positive with index 1.48.  He was most interested in Aubagio.  Aubagio was approved, but patient then became reluctant to take the medication.  He was specifically worried about the potential side effect of peripheral neuropathy.  He then said he did not want to take a pill every day and inquired about injectable meds although he initially did not want to take them.  He was started on Copaxone.        MRI brain was completed 06/02/2021 for surveillance and for a new  "baseline, and this demonstrated for new foci of demyelination, 2 of which demonstrated enhancement.  He denied any new, focal symptoms but was given 3 days of IV steroids in mid June 2021.  Tolerated the steroids well.     Patient started Copaxone in early June 2021.  He initially had some local injection site redness, otherwise did ok.  On 7/3/21 he called the office over the weekend reporting a more severe reaction with his injection.   He injected on 07/02 into the abdomen.  Soon after he had swelling and redness at the injection site, which by the next day was \"baseball sized\", as well as he had hives over his arms abdomen and itching in his genital area.  He took Benadryl the night after his injection and did notice a little bit of improvement the next day.  He was advised to go to urgent care/ ER.  He went to urgent care the next morning and was prescribed an oral prednisone taper.  He was advised to discontinue his glatiramer acetate.  At timing of 7/9/21 visit, decision was made to start dimethyl fumarate.      He started dimethyl fumarate in early August 2021.  MRI brain was updated 2/17/22 and stable compared to 6/2/21, no evidence of disease progression.     He has complained of allodynia and dysthesias in the RUE as well as paresthesias in other parts of his body, but RUE is the worst. He was tried on gabapentin and then switched to Lyrica, neither or which provided any relief. He says he has learned to deal with this sensation over time. He has been taking Flexeril for spasms.     Today, patient reports he has been about the same since his last visit. He denies any new, focal neurologic symptoms. He continues to have some numbness and feeling like his right leg is \"asleep\" at times.  Sometimes when it is \"asleep\" he has an unsteady feeling in that leg, no falls.  No pain or symptoms that interfere with activities or sleep.  He continues on dimethyl fumarate with no issues. He continues on modafinil for " his fatigue. He did see sleep medicine and had a sleep study, which showed mild BROOKE and mild periodic limb movements during sleep.  PAP therapy was recommended.  He has not followed through with getting CPAP.  He is not sure he will be able to use it.    Labs were completed on 9/13/24, ALC 1.1. Normal LFTs.  MRI brain last updated 9/23/23 and stable compared to 2/17/22.    The following portions of the patient's history were reviewed and updated as appropriate: current medications, past family history, past medical history, past social history, past surgical history, and problem list.       Objective:    Blood pressure 114/76, pulse 105, temperature 98.1 °F (36.7 °C), temperature source Temporal, weight 74.8 kg (164 lb 12.8 oz), SpO2 100%.    Physical Exam  Constitutional:       Appearance: Normal appearance.   Eyes:      Extraocular Movements: EOM normal.      Pupils: Pupils are equal, round, and reactive to light.   Neurological:      Mental Status: He is alert.      Motor: Motor strength is normal.     Deep Tendon Reflexes: Reflexes are normal and symmetric.   Psychiatric:         Mood and Affect: Mood normal.         Speech: Speech normal.         Behavior: Behavior normal.         Neurological Exam  Mental Status  Alert. Oriented to person, place, time and situation. Speech is normal. Language is fluent with no aphasia. Attention and concentration are normal.    Cranial Nerves  CN II: Visual fields full to confrontation.  CN III, IV, VI: Extraocular movements intact bilaterally. Pupils equal round and reactive to light bilaterally.  CN V: Facial sensation is normal.  CN VII: Full and symmetric facial movement.  CN VIII: Hearing is normal.  CN IX, X: Palate elevates symmetrically  CN XI: Shoulder shrug strength is normal.  CN XII: Tongue midline without atrophy or fasciculations.    Motor   Normal muscle tone. Strength is 5/5 throughout all four extremities.    Sensory  Light touch is normal in upper and lower  extremities.     Reflexes  Deep tendon reflexes are 2+ and symmetric in all four extremities.    Coordination  Right: Finger-to-nose normal.Left: Finger-to-nose normal.    Gait  Casual gait is normal including stance, stride, and arm swing.        ROS:    Review of Systems   Constitutional:  Negative for appetite change, fatigue and fever.   HENT: Negative.  Negative for hearing loss, tinnitus, trouble swallowing and voice change.    Eyes: Negative.  Negative for photophobia, pain and visual disturbance.   Respiratory: Negative.  Negative for shortness of breath.    Cardiovascular: Negative.  Negative for palpitations.   Gastrointestinal: Negative.  Negative for nausea and vomiting.   Endocrine: Negative.  Negative for cold intolerance.   Genitourinary: Negative.  Negative for dysuria, frequency and urgency.   Musculoskeletal:  Positive for myalgias (muscle spams not as frequent in LE). Negative for back pain, gait problem, neck pain and neck stiffness.   Skin: Negative.  Negative for rash.   Allergic/Immunologic: Negative.    Neurological: Negative.  Negative for dizziness, tremors, seizures, syncope, facial asymmetry, speech difficulty, weakness, light-headedness, numbness and headaches.   Hematological: Negative.  Does not bruise/bleed easily.   Psychiatric/Behavioral: Negative.  Negative for confusion, hallucinations and sleep disturbance.      I personally reviewed and updated the ROS as appropriate

## 2024-12-18 DIAGNOSIS — R51.9 HEADACHE: ICD-10-CM

## 2024-12-18 RX ORDER — LANOLIN ALCOHOL/MO/W.PET/CERES
400 CREAM (GRAM) TOPICAL 2 TIMES DAILY
Qty: 60 TABLET | Refills: 3 | Status: SHIPPED | OUTPATIENT
Start: 2024-12-18

## 2025-01-24 NOTE — ASSESSMENT & PLAN NOTE
47-year-old male presents for a hospital follow-up  He was admitted to [facility] in August 2020 and seen by our Neurology group there  He presented with generalized weakness, left greater than right  He was found to have evidence of demyelinating disease in the brain and cervical spine without enhancement  LP was also completed and MS panel in the CSF was positive with greater than 5 oligoclonal bands and elevated IgG synthesis rate  He was given 5 days of IV steroids at that time  He was going to follow with [facility] closer to him, however there were insurance issues  He more recently presented to [facility] in December with right hand dysthesias, felt to be related to carpal tunnel  He had MRI brain following his discharge which showed a new, enhancing lesion in the left paramedian anterior frontal lobe  He can recall several years of neurologic symptoms including sensory changes, weakness and bladder changes, however was a heavy illicit drug user and said the symptoms were either ignored or not felt to the extent they would have been if he was not high  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  He has a family history of MS in his mother, who he does not have any contact with  We reviewed all of his studies from his hospitalization in August, as well as his more recent MRI brain in December 2020  He does meet the criteria for MS due to lesions  over time and space, as well as a positive lumbar puncture  We reviewed the pathophysiology of MS in detail today  We reviewed acute treatment with steroids and chronic treatment with disease modifying therapies  Patient is adamant about no injectable medications due to his prior IV drug use and does not want any type of needles in his home  Reviewed the oral medications in detail today  His fiancee is here and says they are unsure if they are planning any pregnancy  This would be concerning with the medication Aubagio  Review Tecfidera and Vumerity with him as well  I gave him some information to look over today  I would like to order some additional labs including JCV, NMO, QuantiFERON gold, B12, vitamin-D, Lyme, and see him back in 2-3 weeks to choose a DMT  Patient also has some dysthesias, especially when he is taking a hot shower  Discussed we can try gabapentin to see if that helps  Will start 300 mg HS and titrate as needed/tolerated  Side effects discussed  He was advised to call with any new or worsening symptoms

## 2025-01-28 ENCOUNTER — TELEPHONE (OUTPATIENT)
Age: 45
End: 2025-01-28

## 2025-01-28 NOTE — TELEPHONE ENCOUNTER
Likely no assistance programs for dimethyl fumarate, as it is generic.  We can use Vumerity instead, which is branded, and has copay assistance program.  Start form likely needs to be done, can we confirm?

## 2025-01-28 NOTE — TELEPHONE ENCOUNTER
"Outbound call placed to [facility] and spoke with Cruz.  Cruz stated he tried running the Dimethyl Fumarate Rx through and it keeps coming back as \"reversed.\" Spoke with the pharmacist who was able to run the medication through.     -Dimethyl Fumarate: is still on formulary and does not need a PA but it is part of the specialty program medications and will have high co-pay of $1000.    Alternative Medications that are on formulary: Vumerity and Bafiertam. But medications are also part of specialty program medications and would have the same co-pay of $1000.    Stated that pt would have to contact specialty pharmacy to discuss co-pay assistance: which pt already did.     SW: are there any other assistance programs for Dimethyl Fumarate? If not are there any for the Vumerity or Bafiertam since those 2 are also covered by pt's insurance.     Gaby: please advise on note below with regards to how pt is feeling and questions about not having the medication since 1/19/25.         "

## 2025-01-28 NOTE — TELEPHONE ENCOUNTER
01/28/25    Patient called office today to notify Miss. Griffin the following:    Per Patient [facility] him last week and told him that they won't be delivering his DIMETHYL FUMARATE Medication, due that his insurance won't cover it and is Aid Assistance that covered his Co-Payment dropped him and he no longer haves the assistance.    Patient last Dose was Sunday the 19th.     Patient would like to know if there is an alternative Medication that his insurance will cover, or any other assistance that he qualifies for and help with the co-payment.    Per Patient he was informed that the Medication Co-Payments is $1000.00.      Please contact Patient with any advice, suggestion, or status of the matter.     Please and Thank You.       NOTE:  Patient also shared that he is feeling well, besides the body aches, but he thinks that it can be related to his cold.    He would like to know if him being without the medication would it cause any negative effect on him.

## 2025-01-30 NOTE — TELEPHONE ENCOUNTER
Billie Larsen PA-C; Neurology Multiple Sclerosis Team 3Yesterday (8:51 AM)     Yes start form needs completed and sent to [facility] with insurance card copies.  They will review and contact patient.  At that time if patient needs financial assistance they will let know what may be available.  Will also need prior auth with documentation.  I will keep an eye on things so I can help when needed.  Thank you.

## 2025-02-06 NOTE — TELEPHONE ENCOUNTER
Pt calling regarding below.    Made him aware of aptrick's message and that start form needs to be completed and PA possibly.   He is agreeable to vumerity.    Please assist with completing start form    [phone] to leave detailed message

## 2025-02-06 NOTE — TELEPHONE ENCOUNTER
SW monitoring for start form/PA and insurance card copies to be completed and documentation sent to [employer].  Ionce completed by clinical team [employer] will reach out to patient.     At that time if patient needs financial assistance they will let know what may be available and SW can assist.

## 2025-02-07 NOTE — TELEPHONE ENCOUNTER
Vumerity start form completed. Emailed to MA for provider signature. Please fax with copy of insurance cards once signed and scan into chart. Thanks!    Initiated Vumerity PA on [facility]: [id number]. Received the following message:    This medication is on the member's plan list of covered drugs. Prior authorization is not required at this time. If there are any questions regarding the processing of the prescription, please contact [employer] at [phone]. The member may contact Member Services by calling the number on the back of their ID Card if additional information such as cost-share or tier status is needed.     PA is not required for Vumerity.

## 2025-02-07 NOTE — TELEPHONE ENCOUNTER
Spoke w/pt. Advised him of Gaby's note below. Provided additional education on MS process/role of DMTs. Pt verbalized understanding. He states he did not really understand the autoimmune process of MS. He is agreeable to be on a DMT. He and his wife would like to discuss options w/Gaby.     Scheduled pt on 2/10/25 @ 3pm.     Gaby - can this be a virtual appt? 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 I will change in chart once you approve.

## 2025-02-07 NOTE — TELEPHONE ENCOUNTER
"Nursing--please inform patient that he can certainly schedule an appt to discuss, but medical marijuana is NOT a substitute for disease modifying therapy.  Medical marijuana may help with symptomatic relief such as with neuropathic pain, spasms etc, but it does not alter the disease process.  DMTs help prevent further areas of demyelination which could lead to new symptoms/deficits, and medical marijuana does not do that.  DMTs do not help with symptoms, so that is why sometimes patients feel \"no different\" off or on disease modifying therapy.   "

## 2025-02-07 NOTE — TELEPHONE ENCOUNTER
ANITA called patient at [phone].  ANITA informed patient that he can call [employer] to see if he would be eligible for the copay card or any other financial resources for [employer].  Patient said he got a text from [employer] this morning.      Patient hasn't had Tecfidera since 1/19/2025 and feels ok.  Spoke with his wife and feels like with the medical marijuana he may not need to take any other medication.  Patient said before calling [employer] he would like to have an appointment scheduled with Gaby to discuss his options.      ANITA awaiting plan.

## 2025-02-10 ENCOUNTER — TELEMEDICINE (OUTPATIENT)
Dept: NEUROLOGY | Facility: CLINIC | Age: 45
End: 2025-02-10
Payer: COMMERCIAL

## 2025-02-10 VITALS — BODY MASS INDEX: 24.86 KG/M2 | HEIGHT: 68 IN | WEIGHT: 164 LBS

## 2025-02-10 DIAGNOSIS — E55.9 VITAMIN D DEFICIENCY: ICD-10-CM

## 2025-02-10 DIAGNOSIS — G35 MULTIPLE SCLEROSIS (HCC): Primary | ICD-10-CM

## 2025-02-10 PROCEDURE — 99214 OFFICE O/P EST MOD 30 MIN: CPT | Performed by: PHYSICIAN ASSISTANT

## 2025-02-10 NOTE — PATIENT INSTRUCTIONS
Continue to move forward with starting Vumerity.  Call [employer] and then call my nursing staff to update us   Wait to get blood work done until you have been on Vumerity for 1 month  Follow up on 3/26/25 as scheduled

## 2025-02-10 NOTE — PROGRESS NOTES
"Virtual Regular Visit  Name: Elvis Hart      : 1980      MRN: [id number]  Encounter Provider: Gaby Larsen PA-C  Encounter Date: 2/10/2025   Encounter department: [facility] [place]      Verification of patient location:  Patient is located at Home in the following state in which I hold an active license [place] :  Assessment & Plan  Multiple sclerosis (HCC)  Patient remains clinically stable at this time.  He has been on dimethyl fumarate for 3.5 years, has tolerated well.  He has mild lymphopenia that we have been monitoring.    More recently, he has not been able to get the dimethyl fumarate since there is no more copay assistance programs for the generic.  We advised changing to Vumerity, since this is branded and has available assistance programs.  This has been approved it seems, and he just needs to reach out to [employer] to enroll in assistance program.  However, he recently asked if he needed DMT because he feels the same off the med as he did while on it, with no new symptoms.  He has only been off dimethyl fumarate for 3 weeks.    Reviewed pathophysiology of MS and role of disease modifying therapy.  Discussed DMTs prevent against disease progression and new disease activity, but will not assist with already accumulated symptoms/deficits from prior areas of demyelination.  This is why he feels \"the same\" off or on the DMT.  We discussed this is why we use other medications for symptomatic relief.  Discussed DMTs are needed to help prevent disease progression, and I recommended he proceed with starting Vumerity.    He is agreeable to proceeding with starting Vumerity and will contact [employer].  He will keep our office updated on the process.    Discussed getting blood work done with his standing order 1 month after starting the Vumerity.    Follow up at next regularly scheduled appt in late March       Vitamin D deficiency  Continue supplement            Encounter provider Gaby" KLAUDIA Larsen    The patient was identified by name and date of birth. Elvis Hart was informed that this is a telemedicine visit and that the visit is being conducted through the Epic Embedded platform. He agrees to proceed..  My office door was closed. No one else was in the room.  He acknowledged consent and understanding of privacy and security of the video platform. The patient has agreed to participate and understands they can discontinue the visit at any time.    Patient is aware this is a billable service.     History of Present Illness     HPI  Elvis Hart is a 44-year-old male who presents today for a fit in visit.  He was last seen in September 2024.     Patient had initially presented to [facility] on 8/23/20 with generalized weakness x 2 days. Prior to admission his left side appeared weaker than the right, he was falling to the left with ambulation, and had mild speech difficulties.  CT head showed a white matter hypodensity in the left frontal region which could represent area of demyelination or prior infarct.  No acute changes.  CTA head and neck with no occlusive disease.  Neurology was consulted.  On exam he was found found to have left upper and lower extremity ataxia.  MRI brain demonstrated multiple ovoid bilateral subcortical and periventricular T2 signal lesions oriented perpendicular to the ventricular system. Single similar T2 lesion oriented in the right anterior inferior lateral thalamus. Repeat study with contrast did not demonstrate any enhancement.  MRI C-spine demonstrated T2 lesions compatible with a history of MS in the left lateral cord at C2-C3, mid cord at C3-C4 small right anterior lesion at C4. There is no abnormal enhancement.  MRI T-spine demonstrated a healed T7 vertebral body fracture with hardware. There is no clear T2 signal abnormalities noted within the thoracic cord. There is no abnormal enhancement.  LP was performed.  Basic CSF studies were unremarkable  including glucose, protein, rbc's, wbc's.  MS panel was positive with >5 oligoclonal bands not present in the serum, elevated IgG synthesis rate, normal MBP.  He completed 5 days of high-dose Solu-Medrol 1 g daily.  He was told he met the criteria for MS diagnosis.  At time of discharge, the plan was for him to follow with [facility]. He did not follow-up with [facility] due to insurance reasons.     Patient then presented to [facility] on 12/05/2020 complaining of painful pressure and dysthesias in the right distal upper extremity exacerbated with use.  Neurology was consulted.  It was suspected that he had a peripheral nerve issue such as carpal tunnel syndrome.  It was recommended that he obtain MRI brain outpatient (it was not available at the time he was admitted), and EMG of the right upper extremity.     MRI brain with and without contrast was completed on 12/15/2020 and demonstrated a moderate degree of demyelinating disease with chronic appearing lesions.  There was 1 enhancing subcentimeter lesion in the paramedian anterior left frontal lobe.  EMG of the right upper extremity was completed on 12/11/2020 and demonstrated a mild carpal tunnel syndrome.     Patient reported a family history of MS in his mother, who he has no contact with.  He is not sure what her course has been like.  Patient reported that over the years he has had multiple symptoms that could have been concerning for a neurologic disorder including sensory and motor dysfunction of different limbs at different times, some increased frequency and urgency of urination, some falls.  He reported he had heavy drug use and probably ignored most of it or did not feel most of it to the extent he would have if he was not high.  He no longer uses drugs.     At timing of his hospital follow up visit we discussed disease modifying therapy.  He was adamant about no injectable meds given prior drug use and having needles in the house would be a  "“trigger” for him.  We discussed oral meds.   He is JCV positive with index 1.48.  He was most interested in Aubagio.  Aubagio was approved, but patient then became reluctant to take the medication.  He was specifically worried about the potential side effect of peripheral neuropathy.  He then said he did not want to take a pill every day and inquired about injectable meds although he initially did not want to take them.  He was started on Copaxone.        MRI brain was completed 06/02/2021 for surveillance and for a new baseline, and this demonstrated for new foci of demyelination, 2 of which demonstrated enhancement.  He denied any new, focal symptoms but was given 3 days of IV steroids in mid June 2021.  Tolerated the steroids well.     Patient started Copaxone in early June 2021.  He initially had some local injection site redness, otherwise did ok.  On 7/3/21 he called the office over the weekend reporting a more severe reaction with his injection.   He injected on 07/02 into the abdomen.  Soon after he had swelling and redness at the injection site, which by the next day was \"baseball sized\", as well as he had hives over his arms abdomen and itching in his genital area.  He took Benadryl the night after his injection and did notice a little bit of improvement the next day.  He was advised to go to urgent care/ ER.  He went to urgent care the next morning and was prescribed an oral prednisone taper.  He was advised to discontinue his glatiramer acetate.  At timing of 7/9/21 visit, decision was made to start dimethyl fumarate.      He started dimethyl fumarate in early August 2021.  MRI brain was updated 2/17/22 and stable compared to 6/2/21, no evidence of disease progression.     He has complained of allodynia and dysthesias in the RUE as well as paresthesias in other parts of his body, but RUE is the worst. He was tried on gabapentin and then switched to Lyrica, neither or which provided any relief. He says " he has learned to deal with this sensation over time. He has been taking Flexeril for spasms.     He continues on modafinil for his fatigue. He did see sleep medicine and had a sleep study, which showed mild BROOKE and mild periodic limb movements during sleep. PAP therapy was recommended. He has not followed through with getting CPAP.     Today, patient presents for a fit in visit to discuss DMT.  More recently, he was having trouble affording copay for dimethyl fumarate and there is no more copay assistance program for the drug.  We suggested changing to Vumerity since it is a similar medication and branded, therefore still has copay assistance programs.  We submitted the start form, it has been approved, and he needs to contact [employer].  Patient then questioned if he needs to be on DMT because since he has been off dimethyl fumarate for a few weeks, he feels the same, and wife said he feels like with using medical marijuana, he may not need DMT.  Our nurses provided education on the role of DMTs, but patient wanted a visit to discuss.  He has been off dimethyl fumarate since 1/19/25.  He has not had any new symptoms.      Review of Systems   Constitutional: Negative.  Negative for chills, fatigue and fever.   HENT: Negative.  Negative for hearing loss, tinnitus and trouble swallowing.    Eyes:  Negative for photophobia, pain and visual disturbance.   Respiratory: Negative.  Negative for cough and shortness of breath.    Cardiovascular: Negative.  Negative for chest pain and palpitations.   Gastrointestinal:  Negative for constipation, diarrhea, nausea and vomiting.   Endocrine: Negative.    Genitourinary: Negative.  Negative for difficulty urinating and urgency.   Musculoskeletal: Negative.  Negative for gait problem.   Skin: Negative.  Negative for rash.   Neurological: Negative.  Negative for dizziness, tremors, seizures, weakness, numbness and headaches.   Hematological: Negative.    Psychiatric/Behavioral:   "Negative for confusion and sleep disturbance.        Objective   Ht 5' 8\" (1.727 m)   Wt 74.4 kg (164 lb)   BMI 24.94 kg/m²     Physical Exam  Constitutional:       Appearance: Normal appearance.   Neurological:      Mental Status: He is alert.      Comments: Mental status: AO x 3, able to follow commands, no dysarthria or aphasia    CN 1: not tested  CN 2: not tested  CN 3, 4, 6: no noticeable palsy, EOMs intact  CN 5: not tested  CN 7: face symmetrical  CN 8: hearing intact to voice  CN 9: not tested  CN 10: not tested  CN 11: shoulder shrug symmetric   CN 12: tongue midline     Motor:  No focal weakness or abnormal movements appreciated       Psychiatric:         Mood and Affect: Mood normal.         Behavior: Behavior normal.         Visit Time  Total Visit Duration: 18 min  "

## 2025-02-10 NOTE — ASSESSMENT & PLAN NOTE
"Patient remains clinically stable at this time.  He has been on dimethyl fumarate for 3.5 years, has tolerated well.  He has mild lymphopenia that we have been monitoring.    More recently, he has not been able to get the dimethyl fumarate since there is no more copay assistance programs for the generic.  We advised changing to Vumerity, since this is branded and has available assistance programs.  This has been approved it seems, and he just needs to reach out to [employer] to enroll in assistance program.  However, he recently asked if he needed DMT because he feels the same off the med as he did while on it, with no new symptoms.  He has only been off dimethyl fumarate for 3 weeks.    Reviewed pathophysiology of MS and role of disease modifying therapy.  Discussed DMTs prevent against disease progression and new disease activity, but will not assist with already accumulated symptoms/deficits from prior areas of demyelination.  This is why he feels \"the same\" off or on the DMT.  We discussed this is why we use other medications for symptomatic relief.  Discussed DMTs are needed to help prevent disease progression, and I recommended he proceed with starting Vumerity.    He is agreeable to proceeding with starting Vumerity and will contact [employer].  He will keep our office updated on the process.    Discussed getting blood work done with his standing order 1 month after starting the Vumerity.    Follow up at next regularly scheduled appt in late March       "

## 2025-02-15 DIAGNOSIS — G35 MULTIPLE SCLEROSIS (HCC): ICD-10-CM

## 2025-02-15 DIAGNOSIS — M62.838 MUSCLE SPASMS OF BOTH LOWER EXTREMITIES: ICD-10-CM

## 2025-02-17 RX ORDER — CYCLOBENZAPRINE HCL 10 MG
10 TABLET ORAL
Qty: 30 TABLET | Refills: 11 | Status: SHIPPED | OUTPATIENT
Start: 2025-02-17

## 2025-03-26 ENCOUNTER — OFFICE VISIT (OUTPATIENT)
Dept: NEUROLOGY | Facility: CLINIC | Age: 45
End: 2025-03-26
Payer: COMMERCIAL

## 2025-03-26 VITALS
DIASTOLIC BLOOD PRESSURE: 80 MMHG | RESPIRATION RATE: 18 BRPM | TEMPERATURE: 98.2 F | HEART RATE: 98 BPM | SYSTOLIC BLOOD PRESSURE: 122 MMHG | OXYGEN SATURATION: 99 % | WEIGHT: 163.6 LBS | BODY MASS INDEX: 24.8 KG/M2 | HEIGHT: 68 IN

## 2025-03-26 DIAGNOSIS — R53.83 FATIGUE: ICD-10-CM

## 2025-03-26 DIAGNOSIS — E55.9 VITAMIN D DEFICIENCY: ICD-10-CM

## 2025-03-26 DIAGNOSIS — G35 MULTIPLE SCLEROSIS (HCC): Primary | ICD-10-CM

## 2025-03-26 PROCEDURE — 99214 OFFICE O/P EST MOD 30 MIN: CPT | Performed by: PHYSICIAN ASSISTANT

## 2025-03-26 NOTE — PROGRESS NOTES
Name: Elvis Hart      : 1980      MRN: [id number]  Encounter Provider: Gaby Larsen PA-C  Encounter Date: 3/26/2025   Encounter department: [facility] [place]  :  Assessment & Plan  Multiple sclerosis (HCC)  Patient recently changed DMTs from dimethyl fumarate to Vumerity due to insurance/cost reasons.  He has been on med x 1 month and seems to be tolerating well.  He has been a bit more tired.  He utilizes modafinil.  He is not taking any supplements, previously taking vit D.    Discussed updating MRI brain and c-spine as a new baseline due to change in DMT.  He is agreeable.    Labs are completed every other month due to mild lymphopenia.  Last blood work 3/21/25 with ALC 1.0, stable.  Normal LFTs.  When he gets his labs done in May, will add vit D and B12.    Reviewed importance of healthy lifestyle and routine exercise.  He politely declined referral to PT (significant other wanted him to be referred, he does not want to at this time).     Neurologic exam is stable  Orders:    MRI brain MS wo and w contrast; Future    MRI cervical spine with and without contrast; Future    Vitamin D deficiency  No longer taking a supplement.  Last vit D level was 55 in 2024.  Advised he resume OTC vit D and will recheck a level with next set of routine labs  Orders:    Vitamin D 25 hydroxy; Future    Fatigue  Continue with modafinil 200mg for fatigue PRN.  He had also been referred to sleep medicine last year, had a sleep study which showed mild BROOKE, PAP therapy was recommended. He has pursued this.  He should make an appt with sleep med to discuss.   Orders:    Vitamin B12; Future    Return in 4 months or sooner if needed        History of Present Illness   HPI   Elvis Hart is a 44-year-old male who presents today for MS follow up.      Patient had initially presented to [facility] on 20 with generalized weakness x 2 days. Prior to admission his left side appeared weaker than  the right, he was falling to the left with ambulation, and had mild speech difficulties.  CT head showed a white matter hypodensity in the left frontal region which could represent area of demyelination or prior infarct.  No acute changes.  CTA head and neck with no occlusive disease.  Neurology was consulted.  On exam he was found found to have left upper and lower extremity ataxia.  MRI brain demonstrated multiple ovoid bilateral subcortical and periventricular T2 signal lesions oriented perpendicular to the ventricular system. Single similar T2 lesion oriented in the right anterior inferior lateral thalamus. Repeat study with contrast did not demonstrate any enhancement.  MRI C-spine demonstrated T2 lesions compatible with a history of MS in the left lateral cord at C2-C3, mid cord at C3-C4 small right anterior lesion at C4. There is no abnormal enhancement.  MRI T-spine demonstrated a healed T7 vertebral body fracture with hardware. There is no clear T2 signal abnormalities noted within the thoracic cord. There is no abnormal enhancement.  LP was performed.  Basic CSF studies were unremarkable including glucose, protein, rbc's, wbc's.  MS panel was positive with >5 oligoclonal bands not present in the serum, elevated IgG synthesis rate, normal MBP.  He completed 5 days of high-dose Solu-Medrol 1 g daily.  He was told he met the criteria for MS diagnosis.  At time of discharge, the plan was for him to follow with [facility]. He did not follow-up with [facility] due to insurance reasons.     Patient then presented to [facility] on 12/05[facility]2020 complaining of painful pressure and dysthesias in the right distal upper extremity exacerbated with use.  Neurology was consulted.  It was suspected that he had a peripheral nerve issue such as carpal tunnel syndrome.  It was recommended that he obtain MRI brain outpatient (it was not available at the time he was admitted), and EMG of the right upper extremity.      MRI brain with and without contrast was completed on 12/15/2020 and demonstrated a moderate degree of demyelinating disease with chronic appearing lesions.  There was 1 enhancing subcentimeter lesion in the paramedian anterior left frontal lobe.  EMG of the right upper extremity was completed on 12/11/2020 and demonstrated a mild carpal tunnel syndrome.     Patient reported a family history of MS in his mother, who he has no contact with.  He is not sure what her course has been like.  Patient reported that over the years he has had multiple symptoms that could have been concerning for a neurologic disorder including sensory and motor dysfunction of different limbs at different times, some increased frequency and urgency of urination, some falls.  He reported he had heavy drug use and probably ignored most of it or did not feel most of it to the extent he would have if he was not high.  He no longer uses drugs.     At timing of his hospital follow up visit we discussed disease modifying therapy.  He was adamant about no injectable meds given prior drug use and having needles in the house would be a “trigger” for him.  We discussed oral meds.   He is JCV positive with index 1.48.  He was most interested in Aubagio.  Aubagio was approved, but patient then became reluctant to take the medication.  He was specifically worried about the potential side effect of peripheral neuropathy.  He then said he did not want to take a pill every day and inquired about injectable meds although he initially did not want to take them.  He was started on Copaxone.        MRI brain was completed 06/02/2021 for surveillance and for a new baseline, and this demonstrated for new foci of demyelination, 2 of which demonstrated enhancement.  He denied any new, focal symptoms but was given 3 days of IV steroids in mid June 2021.  Tolerated the steroids well.     Patient started Copaxone in early June 2021.  He initially had some local  "injection site redness, otherwise did ok.  On 7/3/21 he called the office over the weekend reporting a more severe reaction with his injection.   He injected on 07/02 into the abdomen.  Soon after he had swelling and redness at the injection site, which by the next day was \"baseball sized\", as well as he had hives over his arms abdomen and itching in his genital area.  He took Benadryl the night after his injection and did notice a little bit of improvement the next day.  He was advised to go to urgent care/ ER.  He went to urgent care the next morning and was prescribed an oral prednisone taper.  He was advised to discontinue his glatiramer acetate.  At timing of 7/9/21 visit, decision was made to start dimethyl fumarate.      He started dimethyl fumarate in early August 2021.  MRI brain was updated 2/17/22 and stable compared to 6/2/21, no evidence of disease progression.     He has complained of allodynia and dysthesias in the RUE as well as paresthesias in other parts of his body, but RUE is the worst. He was tried on gabapentin and then switched to Lyrica, neither or which provided any relief. He says he has learned to deal with this sensation over time. He has been taking Flexeril for spasms.     He continues on modafinil for his fatigue. He did see sleep medicine and had a sleep study, which showed mild BROOKE and mild periodic limb movements during sleep. PAP therapy was recommended. He has not followed through with getting CPAP.     Patient was more recently seen for a VV on 2/10/2025 to discuss DMT.  He was having trouble affording copay for dimethyl fumarate and there is no more copay assistance program for the drug. We suggested changing to Vumerity since it is a similar medication and branded, therefore still has copay assistance programs. Patient then questioned if he needed to be on DMT because since he had been off dimethyl fumarate for a few weeks, he feels the same, and wife said he feels like with " using medical marijuana, he may not need DMT. Our nurses provided education on the role of DMTs, but patient wanted a visit to discuss. He had been off dimethyl fumarate since 1/19/25. He has not had any new symptoms.  I provided education on the need for DMT and he was agreeable to starting Vumerity.    Patient reports he has been doing well with the Vumerity, taking for 1 month.  No significant side effects.  He has been a bit more tired lately.  His balance has been a little bit more off, but nothing significant.  He had blood work on 3/21/2025 with WBC 9.1, ALC 1.0.  Normal LFTs.  He admits he has not been taking a vitamin D supplement.    Review of Systems   Constitutional: Negative.  Negative for chills and fever.   HENT: Negative.  Negative for ear pain and sore throat.    Eyes: Negative.  Negative for pain and visual disturbance.   Respiratory: Negative.  Negative for cough and shortness of breath.    Cardiovascular: Negative.  Negative for chest pain and palpitations.   Gastrointestinal: Negative.  Negative for abdominal pain and vomiting.   Endocrine: Negative.    Genitourinary: Negative.  Negative for dysuria and hematuria.   Musculoskeletal: Negative.  Negative for arthralgias and back pain.   Skin: Negative.  Negative for color change and rash.   Allergic/Immunologic: Negative.    Neurological: Negative.  Negative for seizures and syncope.   Hematological: Negative.    Psychiatric/Behavioral: Negative.     All other systems reviewed and are negative.   I have personally reviewed the MA's review of systems and made changes as necessary.    Current Outpatient Medications on File Prior to Visit   Medication Sig Dispense Refill    cyclobenzaprine (FLEXERIL) 10 mg tablet TAKE 1 TABLET BY MOUTH DAILY AT BEDTIME 30 tablet 11    magnesium Oxide (MAG-OX) 400 mg TABS TAKE 1 TABLET BY MOUTH TWICE A DAY 60 tablet 3    modafinil (PROVIGIL) 200 MG tablet Take 1-2 tabs daily 60 tablet 5    NON FORMULARY Medical  "Marijuana - daily prn      Vumerity 231 MG CPDR Take 462 mg by mouth 2 (two) times a day 120 capsule 11    [employer] D3 25 MCG (1000 UT) capsule Take 1,000 Units by mouth daily (Patient not taking: Reported on 2/10/2025)      diclofenac (VOLTAREN) 75 mg EC tablet TAKE 1 TABLET BY MOUTH TWICE A DAY (Patient not taking: Reported on 3/26/2025) 60 tablet 3    nicotine (NICODERM CQ) 14 mg/24hr TD 24 hr patch  (Patient not taking: Reported on 3/26/2024)      nicotine (NICODERM CQ) 21 mg/24 hr TD 24 hr patch  (Patient not taking: Reported on 3/26/2024)       No current facility-administered medications on file prior to visit.         Objective   /80 (BP Location: Left arm, Patient Position: Sitting, Cuff Size: Adult)   Pulse 98   Temp 98.2 °F (36.8 °C) (Temporal)   Resp 18   Ht 5' 8\" (1.727 m)   Wt 74.2 kg (163 lb 9.6 oz)   SpO2 99%   BMI 24.88 kg/m²     Physical Exam  Constitutional:       Appearance: Normal appearance.   Eyes:      Extraocular Movements: EOM normal.      Pupils: Pupils are equal, round, and reactive to light.   Neurological:      Mental Status: He is alert.      Motor: Motor strength is normal.     Deep Tendon Reflexes: Reflexes are normal and symmetric.   Psychiatric:         Mood and Affect: Mood normal.         Speech: Speech normal.         Behavior: Behavior normal.       Neurological Exam  Mental Status  Alert. Oriented to person, place, time and situation. Speech is normal. Language is fluent with no aphasia. Attention and concentration are normal.    Cranial Nerves  CN II: Visual fields full to confrontation.  CN III, IV, VI: Extraocular movements intact bilaterally. Pupils equal round and reactive to light bilaterally.  CN V: Facial sensation is normal.  CN VII: Full and symmetric facial movement.  CN VIII: Hearing is normal.  CN IX, X: Palate elevates symmetrically  CN XI: Shoulder shrug strength is normal.  CN XII: Tongue midline without atrophy or fasciculations.    Motor   Normal " muscle tone. Strength is 5/5 throughout all four extremities.    Sensory  Light touch is normal in upper and lower extremities.     Reflexes  Deep tendon reflexes are 2+ and symmetric in all four extremities.    Coordination  Right: Finger-to-nose normal.Left: Finger-to-nose normal.    Gait  Casual gait is normal including stance, stride, and arm swing.

## 2025-03-26 NOTE — ASSESSMENT & PLAN NOTE
Patient recently changed DMTs from dimethyl fumarate to Vumerity due to insurance/cost reasons.  He has been on med x 1 month and seems to be tolerating well.  He has been a bit more tired.  He utilizes modafinil.  He is not taking any supplements, previously taking vit D.    Discussed updating MRI brain and c-spine as a new baseline due to change in DMT.  He is agreeable.    Labs are completed every other month due to mild lymphopenia.  Last blood work 3/21/25 with ALC 1.0, stable.  Normal LFTs.  When he gets his labs done in May, will add vit D and B12.    Reviewed importance of healthy lifestyle and routine exercise.  He politely declined referral to PT (significant other wanted him to be referred, he does not want to at this time).     Neurologic exam is stable  Orders:    MRI brain MS wo and w contrast; Future    MRI cervical spine with and without contrast; Future

## 2025-03-26 NOTE — PATIENT INSTRUCTIONS
Continue Vumerity and other medications the same  Will update MRI brain and cervical spine   In 2 months when you get your routine CBC and hepatic function testing done (standing order labs), I added B12 and vit D to be checked  Follow up in 4 months or sooner if needed

## 2025-03-26 NOTE — ASSESSMENT & PLAN NOTE
No longer taking a supplement.  Last vit D level was 55 in July 2024.  Advised he resume OTC vit D and will recheck a level with next set of routine labs  Orders:    Vitamin D 25 hydroxy; Future

## 2025-04-04 DIAGNOSIS — G35 MULTIPLE SCLEROSIS (HCC): ICD-10-CM

## 2025-04-04 RX ORDER — MODAFINIL 200 MG/1
200-400 TABLET ORAL DAILY
Qty: 60 TABLET | Refills: 0 | Status: SHIPPED | OUTPATIENT
Start: 2025-04-04

## 2025-04-04 NOTE — TELEPHONE ENCOUNTER
03/06/2025 09/30/2024 Modafinil (Tablet) 60.0 30 200 MG NA ELADIA CASTREJON [facility] Commercial Insurance 5 / 5 PA   2 [id number] 02/03/2025 09/30/2024 Modafinil (Tablet) 60.0 30 200 MG [facility]

## 2025-04-17 DIAGNOSIS — R51.9 HEADACHE: ICD-10-CM

## 2025-04-17 RX ORDER — LANOLIN ALCOHOL/MO/W.PET/CERES
400 CREAM (GRAM) TOPICAL 2 TIMES DAILY
Qty: 60 TABLET | Refills: 3 | Status: SHIPPED | OUTPATIENT
Start: 2025-04-17

## 2025-04-22 ENCOUNTER — TELEPHONE (OUTPATIENT)
Age: 45
End: 2025-04-22

## 2025-04-22 NOTE — TELEPHONE ENCOUNTER
Pt called in and stated he needs to get Prior Auth for for the following appts.     MRI brain MS wo and w contrast (Order [id number])  Imaging  Date: 3/26/2025 Department: [facility] Ordering/Authorizing: Gaby Larsen PA-C       MRI cervical spine with and without contrast (Order [id number])  Imaging  Date: 3/26/2025 Department: [facility] Ordering/Authorizing: Gaby Larsen PA-C     Pt stated per his insurance he has to get testing done at [facility].     Location: [address] [place] [address]  Phone# [phone]    Pt is scheduled 5/22/25 at 5:00 pm / 5:30 pm back to back appts for imaging.     Please assist.

## 2025-05-09 DIAGNOSIS — G35 MULTIPLE SCLEROSIS (HCC): ICD-10-CM

## 2025-05-09 NOTE — TELEPHONE ENCOUNTER
Reason for call:   [x] Refill   [] Prior Auth  [x] Other: Patient wanted Gaby to know he is still having an issue getting the Vumerity with the assistance     Office:   [] PCP/Provider -   [x] Specialty/Provider - [facility]     Medication: modafinil (PROVIGIL) 200 MG tablet     Dose/Frequency: 200-400 mg, Daily     Quantity: 60    Pharmacy: [id number]    Local Pharmacy   Does the patient have enough for 3 days?   [x] Yes   [] No - Send as HP to POD    Mail Away Pharmacy   Does the patient have enough for 10 days?   [] Yes   [] No - Send as HP to POD

## 2025-05-09 NOTE — TELEPHONE ENCOUNTER
Medication: Provigil   PDMP  04/04/2025 04/04/2025 Modafinil (Tablet) 60.0 30 200 MG NA [facility] Commercial Insurance 0 / 0 PA   1 [id number] ** 03/06/2025 09/30/2024 Modafinil (Tablet) 60.0 30 200 MG NA [facility] Commercial Insurance 5 / 5 PA   2 [id number] ** 02/03/2025 09/30/2024 Modafinil (Tablet) 60.0 30 200 MG NA [facility] Commercial Insurance 4 / 5 PA   2 [id number] ** 01/03/2025 09/30/2024 Modafinil (Tablet) 60.0 30 200 MG NA [facility] Commercial Insurance 3 / 5  Active agreement on file -No

## 2025-05-12 RX ORDER — MODAFINIL 200 MG/1
200-400 TABLET ORAL DAILY
Qty: 60 TABLET | Refills: 0 | Status: SHIPPED | OUTPATIENT
Start: 2025-05-12

## 2025-05-22 ENCOUNTER — TELEPHONE (OUTPATIENT)
Age: 45
End: 2025-05-22

## 2025-05-22 NOTE — TELEPHONE ENCOUNTER
Sakshi from [facility] called; stated patient is currently in the office and requiring MRI brain MS wo and w contrast (Order [id number]) and MRI cervical spine with and without contrast (Order [id number]) both to be faxed over [facility].    Please assist,    Thank you    Sakshi [facility]  PH #[phone]  FAX #[phone]

## 2025-05-22 NOTE — TELEPHONE ENCOUNTER
Recd warm transfer from Ana SALGUERO    Spoke w/Sakshi from [facility]. Pt is there for his MRI B/C, but he forgot his orders. They will need these in order to proceed. Faxed MRI B/C via [employer] to:    [phone] (f) Sakshi    Stayed on call until orders were recd. Pt will be able to have imaging done tonight.

## 2025-05-28 ENCOUNTER — RESULTS FOLLOW-UP (OUTPATIENT)
Dept: NEUROLOGY | Facility: CLINIC | Age: 45
End: 2025-05-28

## 2025-06-12 NOTE — TELEPHONE ENCOUNTER
Spoke w/pt. His current insurance would not allow him to have imaging done at [facility]. Insurance is capitated to [facility].        Called [facility] [phone] -Gaby. Advised her pt's neurologist would like 5/22/25 MRI brain w/wo (done at [facility]) compared to more recent imaging done at [facility]. Gaby advised that once images are pushed over/report faxed, she will msg the radiologist requesting a comparison/addendum. She notes these images were read by a [facility] provider and this will take a bit longer. Can f/u on Monday when Gaby is back in the office.     Called Radiology Reading [facility]. Spoke w/Virgilio. He pushed over images of 9/23/23 MRI brain to [facility]. They should be available w/in 10-15 mins. He will fax MRI brain report to:    [phone] (f) [facility]    Gaby boyd

## 2025-06-12 NOTE — TELEPHONE ENCOUNTER
----- Message from Gaby Larsen PA-C sent at 5/28/2025  9:38 AM EDT -----  I received patient's brain MRI, which he had done at [facility].  His last MRIs (9/2023, 2/2022, 6/2021) were all done at [facility]  [facility] apparently did not have that imaging, as they are   comparing to 8/2020, which was his initial presentation.    Is there a reason he did not get imaging done at [facility]?  That is fine, but his current imaging needs to be compared to the more recent imaging done at [facility].   He could call  medical records and get a disc of the more recent brain imaging at our facility and bring to [facility] and ask them to do an addendum with comparison.  We may need to assist with   that.  ----- Message -----  From: Interface, Transcription Incoming  Sent: 5/25/2025   6:19 PM EDT  To: Gaby Larsen PA-C

## 2025-06-20 DIAGNOSIS — G35 MULTIPLE SCLEROSIS (HCC): ICD-10-CM

## 2025-06-20 RX ORDER — MODAFINIL 200 MG/1
200-400 TABLET ORAL DAILY
Qty: 60 TABLET | Refills: 3 | Status: SHIPPED | OUTPATIENT
Start: 2025-06-20

## 2025-06-20 NOTE — TELEPHONE ENCOUNTER
CHRISTIANA sent message to Stevie Marquez at probate court to inform that pt has been discharged and notice to court will be sent on 3/11/21 Spoke to pt's wife Nely (on consent form). Advised her of note below. She verbalized understanding.     Pt needs new rx for modafinil. Will address in refill encounter.

## 2025-06-20 NOTE — TELEPHONE ENCOUNTER
Pt's wife Nely advised pt need new rx for modafinil. Is wondering if refills can be provided.     Called [facility]. Spoke w/pharmacist. He confirmed pt can have up to 5 refills. May only fill for a 30 day supply per pt's insurance.    LOV - 3/26/25  F/U 7/25/25    Script pended below.       Gaby Bhatt I put 3 refills on rx. Please sign if agreeable. Thank you.

## 2025-07-16 LAB
25(OH)D3+25(OH)D2 SERPL-MCNC: 42.9 NG/ML (ref 30–100)
VIT B12 SERPL-MCNC: 679 PG/ML (ref 232–1245)

## 2025-07-25 ENCOUNTER — OFFICE VISIT (OUTPATIENT)
Dept: NEUROLOGY | Facility: CLINIC | Age: 45
End: 2025-07-25
Payer: COMMERCIAL

## 2025-07-25 VITALS
OXYGEN SATURATION: 95 % | TEMPERATURE: 97.9 F | DIASTOLIC BLOOD PRESSURE: 82 MMHG | HEIGHT: 68 IN | HEART RATE: 72 BPM | RESPIRATION RATE: 18 BRPM | WEIGHT: 161.2 LBS | SYSTOLIC BLOOD PRESSURE: 124 MMHG | BODY MASS INDEX: 24.43 KG/M2

## 2025-07-25 DIAGNOSIS — G35 MULTIPLE SCLEROSIS (HCC): Primary | ICD-10-CM

## 2025-07-25 DIAGNOSIS — E55.9 VITAMIN D DEFICIENCY: ICD-10-CM

## 2025-07-25 PROCEDURE — 99214 OFFICE O/P EST MOD 30 MIN: CPT | Performed by: PHYSICIAN ASSISTANT

## 2025-07-25 NOTE — PATIENT INSTRUCTIONS
Continue Vumerity  MRI brain was stable   Blood work looks great.  Repeat again before next visit  I would suggest taking over the counter vitamin D3 at least 2000IU daily   Follow up in 4 months

## 2025-07-25 NOTE — ASSESSMENT & PLAN NOTE
He is not currently taking a vitamin D supplement but his most recent level was normal.  He has been deficient in the past.  I suggested he take OTC vitamin D3 2000 IU daily.  May need to increase to 4000 IU daily in the winter.  Orders:    Vitamin D 25 hydroxy; Future

## 2025-07-25 NOTE — ASSESSMENT & PLAN NOTE
Patient remains clinically stable at this time.  He changed from Tecfidera to Vumerity earlier this year due to insurance reasons, tolerating well.    He recently had updated MRI brain 5/22/2025 and this was stable compared to September 2023.    Blood work reviewed from 7/15/2025.  WBC 7.3, ALC 1.0 (very stable).  LFTs normal.  B12 679, vitamin D 42.9.  Will repeat blood work again prior to next visit.    He continues on modafinil for fatigue, which has been helpful.    We again discussed importance of healthy lifestyle including diet and routine exercise.    Neurologic exam is stable today.  Orders:    CBC and differential; Future    Comprehensive metabolic panel; Future    Vitamin B12; Future

## 2025-07-25 NOTE — PROGRESS NOTES
Name: Elvis Hart      : 1980      MRN: [id number]  Encounter Provider: Gaby Larsen PA-C  Encounter Date: 2025   Encounter department: [facility] [place]  :  Assessment & Plan  Multiple sclerosis (HCC)  Patient remains clinically stable at this time.  He changed from Tecfidera to Vumerity earlier this year due to insurance reasons, tolerating well.    He recently had updated MRI brain 2025 and this was stable compared to 2023.    Blood work reviewed from 7/15/2025.  WBC 7.3, ALC 1.0 (very stable).  LFTs normal.  B12 679, vitamin D 42.9.  Will repeat blood work again prior to next visit.    He continues on modafinil for fatigue, which has been helpful.    We again discussed importance of healthy lifestyle including diet and routine exercise.    Neurologic exam is stable today.  Orders:    CBC and differential; Future    Comprehensive metabolic panel; Future    Vitamin B12; Future    Vitamin D deficiency  He is not currently taking a vitamin D supplement but his most recent level was normal.  He has been deficient in the past.  I suggested he take OTC vitamin D3 2000 IU daily.  May need to increase to 4000 IU daily in the winter.  Orders:    Vitamin D 25 hydroxy; Future    Follow-up in 4 months or sooner if needed      History of Present Illness   HPI   Elvis Hart is a 44-year-old male who presents today for MS follow up, last seen in 2025.      Patient had initially presented to [facility] on 20 with generalized weakness x 2 days. Prior to admission his left side appeared weaker than the right, he was falling to the left with ambulation, and had mild speech difficulties.  CT head showed a white matter hypodensity in the left frontal region which could represent area of demyelination or prior infarct.  No acute changes.  CTA head and neck with no occlusive disease.  Neurology was consulted.  On exam he was found found to have left upper and  lower extremity ataxia.  MRI brain demonstrated multiple ovoid bilateral subcortical and periventricular T2 signal lesions oriented perpendicular to the ventricular system. Single similar T2 lesion oriented in the right anterior inferior lateral thalamus. Repeat study with contrast did not demonstrate any enhancement.  MRI C-spine demonstrated T2 lesions compatible with a history of MS in the left lateral cord at C2-C3, mid cord at C3-C4 small right anterior lesion at C4. There is no abnormal enhancement.  MRI T-spine demonstrated a healed T7 vertebral body fracture with hardware. There is no clear T2 signal abnormalities noted within the thoracic cord. There is no abnormal enhancement.  LP was performed.  Basic CSF studies were unremarkable including glucose, protein, rbc's, wbc's.  MS panel was positive with >5 oligoclonal bands not present in the serum, elevated IgG synthesis rate, normal MBP.  He completed 5 days of high-dose Solu-Medrol 1 g daily.  He was told he met the criteria for MS diagnosis.  At time of discharge, the plan was for him to follow with [facility]. He did not follow-up with [facility] due to insurance reasons.     Patient then presented to [facility] on 12/05/2020 complaining of painful pressure and dysthesias in the right distal upper extremity exacerbated with use.  Neurology was consulted.  It was suspected that he had a peripheral nerve issue such as carpal tunnel syndrome.  It was recommended that he obtain MRI brain outpatient (it was not available at the time he was admitted), and EMG of the right upper extremity.     MRI brain with and without contrast was completed on 12/15/2020 and demonstrated a moderate degree of demyelinating disease with chronic appearing lesions.  There was 1 enhancing subcentimeter lesion in the paramedian anterior left frontal lobe.  EMG of the right upper extremity was completed on 12/11/2020 and demonstrated a mild carpal tunnel syndrome.    "  Patient reported a family history of MS in his mother, who he has no contact with.  He is not sure what her course has been like.  Patient reported that over the years he has had multiple symptoms that could have been concerning for a neurologic disorder including sensory and motor dysfunction of different limbs at different times, some increased frequency and urgency of urination, some falls.  He reported he had heavy drug use and probably ignored most of it or did not feel most of it to the extent he would have if he was not high.  He no longer uses drugs.     At timing of his hospital follow up visit we discussed disease modifying therapy.  He was adamant about no injectable meds given prior drug use and having needles in the house would be a “trigger” for him.  We discussed oral meds.   He is JCV positive with index 1.48.  He was most interested in Aubagio.  Aubagio was approved, but patient then became reluctant to take the medication.  He was specifically worried about the potential side effect of peripheral neuropathy.  He then said he did not want to take a pill every day and inquired about injectable meds although he initially did not want to take them.  He was started on Copaxone.        MRI brain was completed 06/02/2021 for surveillance and for a new baseline, and this demonstrated for new foci of demyelination, 2 of which demonstrated enhancement.  He denied any new, focal symptoms but was given 3 days of IV steroids in mid June 2021.  Tolerated the steroids well.     Patient started Copaxone in early June 2021.  He initially had some local injection site redness, otherwise did ok.  On 7/3/21 he called the office over the weekend reporting a more severe reaction with his injection.   He injected on 07/02 into the abdomen.  Soon after he had swelling and redness at the injection site, which by the next day was \"baseball sized\", as well as he had hives over his arms abdomen and itching in his genital " area.  He took Benadryl the night after his injection and did notice a little bit of improvement the next day.  He was advised to go to urgent care/ ER.  He went to urgent care the next morning and was prescribed an oral prednisone taper.  He was advised to discontinue his glatiramer acetate.  At timing of 7/9/21 visit, decision was made to start dimethyl fumarate.      He started dimethyl fumarate in early August 2021.  MRI brain was updated 2/17/22 and stable compared to 6/2/21, no evidence of disease progression.     He has complained of allodynia and dysthesias in the RUE as well as paresthesias in other parts of his body, but RUE is the worst. He was tried on gabapentin and then switched to Lyrica, neither or which provided any relief. He says he has learned to deal with this sensation over time. He has been taking Flexeril for spasms.     He continues on modafinil for his fatigue. He did see sleep medicine and had a sleep study, which showed mild BROOKE and mild periodic limb movements during sleep. PAP therapy was recommended. He has not followed through with getting CPAP.      Patient was seen for a VV on 2/10/2025 to discuss DMT. He was having trouble affording copay for dimethyl fumarate and there is no more copay assistance program for the drug. We suggested changing to Vumerity since it is a similar medication and branded, therefore still has copay assistance programs. Patient then questioned if he needed to be on DMT because since he had been off dimethyl fumarate for a few weeks, he feels the same, and wife said he feels like with using medical marijuana, he may not need DMT. Our nurses provided education on the role of DMTs, but patient wanted a visit to discuss. He had been off dimethyl fumarate since 1/19/25. He has not had any new symptoms. I provided education on the need for DMT and he was agreeable to starting Vumerity.  Vumerity was started in February 2025.      Today, patient reports he is  overall stable.  Continues on Vumerity, tolerating well.  His balance continues to be somewhat problematic, but he has not had any falls, feels this is stable.  He reports some left heel pain and feels that is also throwing off his balance (sounds like he may have a heel spur). He had blood work on 7/15/2025 with WBC 7.3, ALC 1.0. Normal LFTs. Vit D 42.9, B12 679.  He had updated MRI brain 5/22/2025.  This was stable when compared to 9/23/2023 (initially was compared to 8/2020, which was his initial MS presentation).  He had this imaging done at [place], and several prior MRIs in 2023, 2022 and 2021 were all done at [facility].  We were able to get the images from his 2023 MRI at [facility] and down to [place] and an addendum was made stating that the imaging was stable compared to 9/2023 exam.      Review of Systems   Constitutional: Negative.  Negative for chills, fatigue and fever.   HENT: Negative.  Negative for hearing loss, tinnitus and trouble swallowing.    Eyes:  Negative for photophobia, pain and visual disturbance.   Respiratory: Negative.  Negative for cough and shortness of breath.    Cardiovascular: Negative.  Negative for chest pain and palpitations.   Gastrointestinal:  Negative for constipation, diarrhea, nausea and vomiting.   Endocrine: Negative.    Genitourinary: Negative.  Negative for difficulty urinating and urgency.   Musculoskeletal:  Positive for gait problem.   Skin: Negative.  Negative for rash.   Neurological:  Negative for dizziness, tremors, seizures, weakness, numbness and headaches.   Hematological: Negative.    Psychiatric/Behavioral:  Negative for confusion and sleep disturbance.     I have personally reviewed the MA's review of systems and made changes as necessary.    Medications Ordered Prior to Encounter[1]      Objective   /82 (BP Location: Right arm, Patient Position: Sitting, Cuff Size: Standard)   Pulse 72   Temp 97.9 °F (36.6 °C) (Temporal)   Resp 18   Ht 5'  "8\" (1.727 m)   Wt 73.1 kg (161 lb 3.2 oz)   SpO2 95%   BMI 24.51 kg/m²     Physical Exam  Constitutional:       Appearance: Normal appearance.     Eyes:      Extraocular Movements: EOM normal.      Pupils: Pupils are equal, round, and reactive to light.       Neurological:      Mental Status: He is alert.      Motor: Motor strength is normal.     Deep Tendon Reflexes: Reflexes are normal and symmetric.     Psychiatric:         Mood and Affect: Mood normal.         Speech: Speech normal.         Behavior: Behavior normal.       Neurological Exam  Mental Status  Alert. Oriented to person, place, time and situation. Speech is normal. Language is fluent with no aphasia. Attention and concentration are normal.    Cranial Nerves  CN II: Visual fields full to confrontation.  CN III, IV, VI: Extraocular movements intact bilaterally. Pupils equal round and reactive to light bilaterally.  CN V: Facial sensation is normal.  CN VII: Full and symmetric facial movement.  CN VIII: Hearing is normal.  CN IX, X: Palate elevates symmetrically  CN XI: Shoulder shrug strength is normal.  CN XII: Tongue midline without atrophy or fasciculations.    Motor   Normal muscle tone. Strength is 5/5 throughout all four extremities.    Sensory  Light touch is normal in upper and lower extremities.     Reflexes  Deep tendon reflexes are 2+ and symmetric in all four extremities.    Coordination  Right: Finger-to-nose normal.Left: Finger-to-nose normal.    Gait  Casual gait is normal including stance, stride, and arm swing.         [1]   Current Outpatient Medications on File Prior to Visit   Medication Sig Dispense Refill    cyclobenzaprine (FLEXERIL) 10 mg tablet TAKE 1 TABLET BY MOUTH DAILY AT BEDTIME 30 tablet 11    magnesium Oxide (MAG-OX) 400 mg TABS TAKE 1 TABLET BY MOUTH TWICE A DAY 60 tablet 3    modafinil (PROVIGIL) 200 MG tablet Take 1-2 tablets (200-400 mg total) by mouth daily 60 tablet 3    NON FORMULARY Medical Marijuana - daily " prn      Vumerity 231 MG CPDR Take 462 mg by mouth 2 (two) times a day 120 capsule 11    [employer] D3 25 MCG (1000 UT) capsule Take 1,000 Units by mouth daily (Patient not taking: Reported on 2/10/2025)      diclofenac (VOLTAREN) 75 mg EC tablet TAKE 1 TABLET BY MOUTH TWICE A DAY (Patient not taking: Reported on 2/10/2025) 60 tablet 3    nicotine (NICODERM CQ) 14 mg/24hr TD 24 hr patch  (Patient not taking: Reported on 3/26/2024)      nicotine (NICODERM CQ) 21 mg/24 hr TD 24 hr patch  (Patient not taking: Reported on 3/26/2024)       No current facility-administered medications on file prior to visit.

## 2025-08-07 PROBLEM — E04.2 MULTIPLE THYROID NODULES: Status: ACTIVE | Noted: 2025-08-07

## 2025-08-07 PROBLEM — F17.200 TOBACCO DEPENDENCE: Status: ACTIVE | Noted: 2025-08-07

## 2025-08-07 PROBLEM — N52.9 ERECTILE DYSFUNCTION: Status: ACTIVE | Noted: 2025-08-07

## 2025-08-07 PROBLEM — R79.89 LOW TSH LEVEL: Status: ACTIVE | Noted: 2025-08-07

## 2025-08-07 PROBLEM — M54.9 CHRONIC BACK PAIN: Status: ACTIVE | Noted: 2025-08-07

## 2025-08-07 PROBLEM — G89.29 CHRONIC BACK PAIN: Status: ACTIVE | Noted: 2025-08-07

## 2025-08-07 PROBLEM — Z79.899 MEDICAL MARIJUANA USE: Status: ACTIVE | Noted: 2025-08-07

## 2025-08-07 PROBLEM — E04.1 THYROID CYST: Status: ACTIVE | Noted: 2025-08-07

## 2025-08-13 ENCOUNTER — VBI (OUTPATIENT)
Dept: ADMINISTRATIVE | Facility: OTHER | Age: 45
End: 2025-08-13

## 2025-08-25 PROBLEM — K92.1 BLOOD IN STOOL: Status: RESOLVED | Noted: 2022-04-22 | Resolved: 2025-08-25

## 2025-08-25 PROBLEM — R06.81 WITNESSED EPISODE OF APNEA: Status: RESOLVED | Noted: 2024-06-08 | Resolved: 2025-08-25
